# Patient Record
Sex: MALE | Race: WHITE | Employment: FULL TIME | URBAN - METROPOLITAN AREA
[De-identification: names, ages, dates, MRNs, and addresses within clinical notes are randomized per-mention and may not be internally consistent; named-entity substitution may affect disease eponyms.]

---

## 2020-07-16 ENCOUNTER — TELEPHONE (OUTPATIENT)
Dept: SURGERY | Age: 60
End: 2020-07-16

## 2020-07-16 RX ORDER — QUETIAPINE FUMARATE 50 MG/1
50 TABLET, EXTENDED RELEASE ORAL NIGHTLY
COMMUNITY

## 2020-07-16 RX ORDER — SIMVASTATIN 20 MG
20 TABLET ORAL NIGHTLY
COMMUNITY

## 2020-07-16 RX ORDER — DIVALPROEX SODIUM 500 MG/1
500 TABLET, EXTENDED RELEASE ORAL 2 TIMES DAILY
COMMUNITY

## 2020-07-16 NOTE — TELEPHONE ENCOUNTER
MA received call from [address] with the [place] stating they have a referral for the patient they would like to schedule. [facility] [address] stated she could schedule the appointment for the patient and let him know the information. Patient scheduled for appointment 8/13/2020 @ 3:00pm with Dr Shahla Montgomery in [facility]. [address] informed of location and what to have patient bring to the appointment. Appointment letter mailed to patients home address.      Electronically signed by Lucina Meek on 7/16/20 at 10:44 AM EDT

## 2020-08-12 ENCOUNTER — TELEPHONE (OUTPATIENT)
Dept: CASE MANAGEMENT | Age: 60
End: 2020-08-12

## 2020-08-12 NOTE — TELEPHONE ENCOUNTER
I called the patient and he confirmed his CT lung screening at [facility] on 8/13/2020 at 12:00 pm.  I reminded the patient to arrive at 11:30 am, enter through the main entrance, and register. Patient confirmed.         Electronically signed by Florencio Marquis on 8/12/20 at 2:50 PM EDT

## 2020-08-13 ENCOUNTER — HOSPITAL ENCOUNTER (OUTPATIENT)
Dept: CT IMAGING | Age: 60
Discharge: HOME | End: 2020-08-15
Payer: OTHER GOVERNMENT

## 2020-08-13 ENCOUNTER — TELEPHONE (OUTPATIENT)
Dept: CASE MANAGEMENT | Age: 60
End: 2020-08-13

## 2020-08-13 ENCOUNTER — PREP FOR PROCEDURE (OUTPATIENT)
Dept: SURGERY | Age: 60
End: 2020-08-13

## 2020-08-13 ENCOUNTER — OFFICE VISIT (OUTPATIENT)
Dept: SURGERY | Age: 60
End: 2020-08-13
Payer: OTHER GOVERNMENT

## 2020-08-13 VITALS — HEIGHT: 70 IN | BODY MASS INDEX: 30.06 KG/M2 | WEIGHT: 210 LBS

## 2020-08-13 VITALS
DIASTOLIC BLOOD PRESSURE: 72 MMHG | WEIGHT: 222 LBS | HEIGHT: 70 IN | BODY MASS INDEX: 31.78 KG/M2 | HEART RATE: 77 BPM | SYSTOLIC BLOOD PRESSURE: 125 MMHG | OXYGEN SATURATION: 96 %

## 2020-08-13 PROCEDURE — 99204 OFFICE O/P NEW MOD 45 MIN: CPT | Performed by: SURGERY

## 2020-08-13 PROCEDURE — 99202 OFFICE O/P NEW SF 15 MIN: CPT | Performed by: SURGERY

## 2020-08-13 PROCEDURE — G0297 LDCT FOR LUNG CA SCREEN: HCPCS

## 2020-08-13 RX ORDER — SODIUM PICOSULFATE, MAGNESIUM OXIDE, AND ANHYDROUS CITRIC ACID 10; 3.5; 12 MG/160ML; G/160ML; G/160ML
LIQUID ORAL
Qty: 1 KIT | Refills: 0 | Status: ON HOLD
Start: 2020-08-13 | End: 2020-10-05 | Stop reason: HOSPADM

## 2020-08-13 RX ORDER — SODIUM CHLORIDE 0.9 % (FLUSH) 0.9 %
10 SYRINGE (ML) INJECTION EVERY 12 HOURS SCHEDULED
Status: CANCELLED | OUTPATIENT
Start: 2020-08-13

## 2020-08-13 RX ORDER — SODIUM CHLORIDE 0.9 % (FLUSH) 0.9 %
10 SYRINGE (ML) INJECTION PRN
Status: CANCELLED | OUTPATIENT
Start: 2020-08-13

## 2020-08-13 RX ORDER — SODIUM CHLORIDE, SODIUM LACTATE, POTASSIUM CHLORIDE, CALCIUM CHLORIDE 600; 310; 30; 20 MG/100ML; MG/100ML; MG/100ML; MG/100ML
INJECTION, SOLUTION INTRAVENOUS CONTINUOUS
Status: CANCELLED | OUTPATIENT
Start: 2020-08-13

## 2020-08-13 ASSESSMENT — ENCOUNTER SYMPTOMS
BACK PAIN: 0
RECTAL PAIN: 0
BLOOD IN STOOL: 0
SORE THROAT: 0
EYE DISCHARGE: 0
ABDOMINAL PAIN: 0
NAUSEA: 0
CONSTIPATION: 0
SINUS PAIN: 0
SHORTNESS OF BREATH: 0
DIARRHEA: 0
COUGH: 0
EYE REDNESS: 0
ANAL BLEEDING: 1
EYE PAIN: 0
ABDOMINAL DISTENTION: 0
WHEEZING: 0
VOMITING: 0

## 2020-08-13 NOTE — PATIENT INSTRUCTIONS
Dr. Luna Saras[place] recommended colonoscopy with possible biopsy or polypectomy and he explained the risk, benefits, expected outcome, and alternatives to the procedure. Risks included but are not limited to bleeding, infection, respiratory distress, hypotension, and perforation of the colon. You understood and were in agreement. You will need to have someone bring you to the hospital and take you home because you will not be able to drive or work the rest of that day. Also, you need to have someone stay with you the rest of the day to make sure you do not develop any complications. [facility] General Surgery  CLENPIQ SPLIT COLON PREP  COLON PREP FOR COLONOSCOPY OR COLON SURGERY    It is very important that you follow all of the instructions listed on this sheet carefully (they may be slightly different than the directions on the product that you purchase at the pharmacy) to ensure that your colon is adequately cleaned out or your risk of complications could be increased. 2 Days or More Before Endoscopy:  [address] prep from the pharmacy.  Do not eat corn, tomatoes, peas or watermelon for 5 days before procedure.  If you are on INSULIN or OTHER DIABETIC MEDICATIONS, then check with your primary care physician as to how to adjust your medication while on clear liquid diet and when nothing by mouth. 1 Day Before the Endoscopy:   No solid food - only clear liquids (soup, jello, or juice that you can see through with no solid food) for breakfast, lunch and supper. DO NOT drink or eat anything that is red as it will turn the inside of the colon red and look like blood.  Have at least 8 oz or more of clear liquids for breakfast (7 am to 8 am) and lunch (11:30 am to 12:30 pm).    5 pm, take first 5.4 ounce bottle of CLENPIQ   Over the next 4 to 5 hours drink at least five 8 ounce cups of any of the above clear liquids   You can continue to take liquids until 12 midnight then nothing to eat or drink except as instructed below    Day of Endoscopy:   5 hours prior to scheduled time for colonoscopy, take the second 5.4 ounce bottle of CLENPIQ   Over the next 1 to 2 hours, drink three 8 ounce cups of any of the above clear liquids   Do Not drink anything further within 3 hours of the scheduled time for your colonoscopy!  If any blood pressure medications or heart medications are due in the morning, you should take them with a sip of water. Patient Information and Instructions for Colonoscopy         Definition of Colonoscopy   A colonoscopy is the visual exam of the rectum and colon (large intestine). The exam is done with a tool called a colonoscope. The colonoscope is a flexible tube with a tiny camera on the end. This instrument allows the doctor to view the inside of your rectum and colon. Sigmoidoscopy is a shorter scope that views only the last one third of the colon. Reasons for Colonoscopy   It is used to examine, diagnose, and treat problems in your large intestine. The procedure is most often done for the following reasons: To determine the cause of abdominal pain, rectal bleeding, or a change in bowel habits   To detect and treat colon cancer or colon polyps   To obtain tissue samples for testing   To stop intestinal bleeding   Monitor response to treatment if you have inflammatory bowel disease     Possible Complications   Complications are rare, but no procedure is completely free of risk.  If you are planning to have a colonoscopy, your doctor will review a list of possible complications, which may include:   Bleeding   Reaction to the sedation causing drop in your blood pressure or problems breathing  Perforation or puncture of the bowel     Factors that may increase the risk of complications include:   Pre-existing heart or kidney condition   Treatment with certain medicines, including aspirin and other drugs with anticoagulant or blood-thinning properties   Prior abdominal surgery or radiation treatments   Active colitis , diverticulitis , or other acute bowel disease   Previous treatment with radiation therapy     Be sure to discuss these risks with your doctor before the procedure. What to Expect   Prior to Procedure   Your doctor will likely do the following:   Physical exam   Health history   Review of medicines   Test your stool for hidden blood (called \"occult blood\")     Your colon must be completely clean before the procedure. Any stool left in the intestine will block the view. This preparation may start several days before the procedure. Follow your doctor's instructions. Leading up to your procedure:   Talk to your doctor about your medicines. You may be asked to stop taking some medicines up to one week before the procedure, like:   Anti-inflammatory drugs (e.g., aspirin )   Blood thinners like clopidogrel (Plavix) or warfarin (Coumadin)   Iron supplements or vitamins containing iron   The day or days before your procedure, go on a clear liquid diet (clear broth, clear juice, clear jello) with no red coloring  Do not eat or drink anything after midnight. Wear comfortable clothing. If you have diabetes, ask your doctor if you need to adjust your diabetes medicine on the day prior to your procedure and the day of your procedure. Arrange for a ride home after the procedure. Anesthesia   You will receive intravenous sedation medicine for the procedure so you will not feel anything during the procedure. Description of the Procedure   You will lie on your left side with knees bent and drawn up toward your chest. The colonoscope will be slowly inserted through the rectum and into the bowel. The colonoscope will inject air into the colon. A small attached video camera will allow the doctor to view the colon's lining on a screen. The doctor will continue guiding the tool through the bowel and assess the lining.  A tissue sample or polyps may be removed during the procedure. How Long Will It Take? Usually it takes about 30 to 45 minutes     Will It Hurt? Most people do not feel anything during the procedure and will not remember the procedure. After the procedure, gas pains and cramping are common. These pains should go away with the passing of gas. Post-procedure Care   If any tissue was removed: It will be sent to a lab to be examined. It may take 1-2 weeks for results. The doctor will usually give an initial report after the scope is removed. Other tests may be recommended. A small amount of bleeding may occur during the first few days after the procedure. When you return home after the procedure, be sure to follow your doctor's instructions, which may include:   Resume medicines as instructed by your doctor. Resume normal diet, unless directed otherwise by your doctor. The sedative will make you drowsy. Avoid driving, operating machinery, or making important decisions for the rest of the day. Rest for the remainder of the day. After arriving home, contact your doctor if any of the following occurs:   Bleeding from your rectum, notify your doctor if you pass a teaspoonful of blood or more. Black, tarry stools   Severe abdominal pain   Hard, swollen abdomen   Signs of infection, including fever or chills   Inability to pass gas or stool   Coughing, shortness of breath, chest pain, severe nausea or vomiting     In case of an emergency, CALL 911 .

## 2020-08-13 NOTE — PROGRESS NOTES
Scheduled pt for Colonoscopy on 10/5/20 at 8:00 am. Pt needs to arrive at [address] at 7:00 am. Confirmed in office. Sent instruction sheet.   Electronically signed by Merlin Loll on 8/13/20 at 1:52 PM EDT

## 2020-08-13 NOTE — LETTER
Worry: Not on file     Inability: Not on file    Transportation needs     Medical: Not on file     Non-medical: Not on file   Tobacco Use    Smoking status: Current Every Day Smoker     Packs/day: 1.00    Smokeless tobacco: Never Used   Substance and Sexual Activity    Alcohol use: Yes     Alcohol/week: 3.0 standard drinks     Types: 3 Cans of beer per week     Comment: Thursdays - about 3 beers     Drug use: No    Sexual activity: Not on file   Lifestyle    Physical activity     Days per week: Not on file     Minutes per session: Not on file    Stress: Not on file   Relationships    Social connections     Talks on phone: Not on file     Gets together: Not on file     Attends Jain service: Not on file     Active member of club or organization: Not on file     Attends meetings of clubs or organizations: Not on file     Relationship status: Not on file    Intimate partner violence     Fear of current or ex partner: Not on file     Emotionally abused: Not on file     Physically abused: Not on file     Forced sexual activity: Not on file   Other Topics Concern    Not on file   Social History Narrative    Not on file     Review of Systems   Constitutional: Negative for chills, fever and unexpected weight change. HENT: Negative for congestion, hearing loss, nosebleeds, sinus pain and sore throat. Eyes: Negative for pain, discharge and redness. Respiratory: Negative for cough, shortness of breath and wheezing. Cardiovascular: Negative for chest pain, palpitations and leg swelling. Gastrointestinal: Positive for anal bleeding. Negative for abdominal distention, abdominal pain, blood in stool, constipation, diarrhea, nausea, rectal pain and vomiting. Endocrine: Negative for cold intolerance and heat intolerance. Genitourinary: Negative for dysuria, frequency, hematuria and urgency. Musculoskeletal: Negative for back pain and neck pain. Skin: Positive for rash (saw dermatologist 8/13/2020 and \"frozen\" skin lesions on left cheek). Allergic/Immunologic: Negative for environmental allergies. Neurological: Negative for dizziness, tremors, seizures, weakness and headaches. Hematological: Does not bruise/bleed easily. Psychiatric/Behavioral: Positive for dysphoric mood (controlled with medications). The patient is nervous/anxious (controlled with medications). Physical Exam:  Vitals:    08/13/20 1307   BP: 125/72   Site: Left Upper Arm   Position: Sitting   Cuff Size: Medium Adult   Pulse: 77   SpO2: 96%   Weight: 222 lb (100.7 kg)   Height: 5' 10\" (1.778 m)       Body mass index is 31.85 kg/m². Physical Exam  Constitutional:       General: He is not in acute distress. Appearance: He is well-developed. He is not diaphoretic. HENT:      Head: Normocephalic and atraumatic. Eyes:      General:         Right eye: No discharge. Left eye: No discharge. Neck:      Musculoskeletal: Normal range of motion. Cardiovascular:      Rate and Rhythm: Normal rate and regular rhythm. Heart sounds: Heart sounds are distant. No murmur. No friction rub. No gallop. Pulmonary:      Effort: Pulmonary effort is normal. No respiratory distress. Breath sounds: Normal breath sounds. No wheezing or rales. Chest:      Chest wall: No tenderness. Abdominal:      General: Bowel sounds are normal. There is no distension. Palpations: Abdomen is soft. Abdomen is not rigid. There is no mass. Tenderness: There is no abdominal tenderness. There is no guarding or rebound. Hernia: There is no hernia in the ventral area or left inguinal area. Comments: Periumbilical and other abdominal laparoscopic surgical scars   Genitourinary:     Penis: Normal.       Scrotum/Testes:         Right: Mass, tenderness or swelling not present. Left: Mass, tenderness or swelling not present. Prostate: Not enlarged and not tender. Rectum: Guaiac result negative. No mass, tenderness, anal fissure, external hemorrhoid or internal hemorrhoid. Normal anal tone. Comments: Chronic dermatitis in both groins and perianal areas  Musculoskeletal: Normal range of motion. General: No deformity. Skin:     General: Skin is warm and dry. Coloration: Skin is not pale. Findings: No erythema or rash. Neurological:      Mental Status: He is alert and oriented to person, place, and time. Psychiatric:         Behavior: Behavior normal.         Judgment: Judgment normal.     Assessment/Plan:  1. Intermittent Rectal Bleeding and History of Colon Polyps - I recommended diagnostic colonoscopy with possible biopsy or polypectomy and explained the risk, benefits, expected outcome, and alternatives to the procedure. Risks included but are not limited to bleeding, infection, respiratory distress, hypotension, and perforation of the colon. The patient understands and is in agreement. 2. Essential Hypertension  3. Hyperlipidemia  4. Depression    Electronically signed by Michoacano Leal MD on 8/13/20 at 1:23 PM EDT    Scheduled pt for Colonoscopy on 10/5/20 at 8:00 am. Pt needs to arrive at [address] [facility] at 7:00 am. Confirmed in office. Sent instruction sheet.   Electronically signed by Adriana Arechiga on 8/13/20 at 1:52 PM EDT

## 2020-08-13 NOTE — TELEPHONE ENCOUNTER
Updating patient's weight and height.        Electronically signed by Errol Chi on 8/13/20 at 12:13 PM EDT

## 2020-08-13 NOTE — PROGRESS NOTES
History and Physical    Patient's Name/Date of Birth: Maki Ballard /1960, [de-identified]64 y.o.), male      Date: August 13, 2020     Chief Complaint:   Chief Complaint   Patient presents with    Surgical Consult     [facility] - patient has had [facility]oscopy, last colonoscopy 4-5 years patient unsure where or by whom     Colonoscopy     no known personal or family history of colon cancer, patient states he believes he had 2+ polyps removed at the last procedure     Other     patient has no complaints      HPI:   The patient was seen in the office today for follow up colonoscopy. He had a colonoscopy about 5 years ago but there was no report available and the patient does not recall where it was done or who the provider was. Patient reports that several polyps were removed and he was told that they were \"OK\" but again he does not know what kind of polyps or when he was recommended another colonoscopy. Also, the patient has been having intermittent rectal bleeding for 2 years with bright red blood when he wipes. It occurs for one or two consecutive BMs about every other month and it frequently follows exertion. He denied any blood in the stool or the commode commode  He denied any diarrhea or constipation and has normal BMs daily. He denied any abdominal pain or bloating. He denied any heartburn, indigestion, nausea, or vomiting. His family history was negative for colon cancer or polyps.     Past Medical History:   Diagnosis Date    Anxiety     Bipolar disorder ([facility] [address]     Hyperlipidemia     Hypertension     Major depressive disorder     PTSD (post-traumatic stress disorder)      Past Surgical History:   Procedure Laterality Date    APPENDECTOMY  2018    laparoscopic, acute appenditis, [address] [place] [address]  [facility]    approximately 2015, per patient had polyps removed but no report available for review, pt does not remember where it was done at or who did it    TOOTH EXTRACTION  1980 1980s x 2       Current Outpatient Medications   Medication Sig Dispense Refill    divalproex (DEPAKOTE ER) 500 MG extended release tablet Take 500 mg by mouth 2 times daily Take one tablet by mouth every morning and take one tablet at bedtime for mood      MENTHOL-METHYL SALICYLATE EX Apply topically Cream. Apply small amount to affected area three times a day if needed for pain      QUEtiapine (SEROQUEL XR) 50 MG extended release tablet Take 50 mg by mouth nightly Take one tablet by mouth at bedtime for mood.  simvastatin (ZOCOR) 20 MG tablet Take 20 mg by mouth nightly Take one tablet by mouth at bedtime for cholesterol       No current facility-administered medications for this visit. No Known Allergies    Family History   Problem Relation Age of Onset    Kidney Disease Mother        Social History     Socioeconomic History    Marital status: Legally      Spouse name: Not on file    Number of children: Not on file    Years of education: Not on file    Highest education level: Not on file   Occupational History    Not on file   Social Needs    Financial resource strain: Not on file    Food insecurity     Worry: Not on file     Inability: Not on file    Transportation needs     Medical: Not on file     Non-medical: Not on file   Tobacco Use    Smoking status: Current Every Day Smoker     Packs/day: 1.00    Smokeless tobacco: Never Used   Substance and Sexual Activity    Alcohol use:  Yes     Alcohol/week: 3.0 standard drinks     Types: 3 Cans of beer per week     Comment: Thursdays - about 3 beers     Drug use: No    Sexual activity: Not on file   Lifestyle    Physical activity     Days per week: Not on file     Minutes per session: Not on file    Stress: Not on file   Relationships    Social connections     Talks on phone: Not on file     Gets together: Not on file     Attends Buddhism service: Not on file     Active member of club or organization: Not on file     Attends meetings of clubs or organizations: Not on file     Relationship status: Not on file    Intimate partner violence     Fear of current or ex partner: Not on file     Emotionally abused: Not on file     Physically abused: Not on file     Forced sexual activity: Not on file   Other Topics Concern    Not on file   Social History Narrative    Not on file     Review of Systems   Constitutional: Negative for chills, fever and unexpected weight change. HENT: Negative for congestion, hearing loss, nosebleeds, sinus pain and sore throat. Eyes: Negative for pain, discharge and redness. Respiratory: Negative for cough, shortness of breath and wheezing. Cardiovascular: Negative for chest pain, palpitations and leg swelling. Gastrointestinal: Positive for anal bleeding. Negative for abdominal distention, abdominal pain, blood in stool, constipation, diarrhea, nausea, rectal pain and vomiting. Endocrine: Negative for cold intolerance and heat intolerance. Genitourinary: Negative for dysuria, frequency, hematuria and urgency. Musculoskeletal: Negative for back pain and neck pain. Skin: Positive for rash (saw dermatologist 8/13/2020 and \"frozen\" skin lesions on left cheek). Allergic/Immunologic: Negative for environmental allergies. Neurological: Negative for dizziness, tremors, seizures, weakness and headaches. Hematological: Does not bruise/bleed easily. Psychiatric/Behavioral: Positive for dysphoric mood (controlled with medications). The patient is nervous/anxious (controlled with medications). Physical Exam:  Vitals:    08/13/20 1307   BP: 125/72   Site: Left Upper Arm   Position: Sitting   Cuff Size: Medium Adult   Pulse: 77   SpO2: 96%   Weight: 222 lb (100.7 kg)   Height: 5' 10\" (1.778 m)       Body mass index is 31.85 kg/m². Physical Exam  Constitutional:       General: He is not in acute distress. Appearance: He is well-developed.  He is not diaphoretic. HENT:      Head: Normocephalic and atraumatic. Eyes:      General:         Right eye: No discharge. Left eye: No discharge. Neck:      Musculoskeletal: Normal range of motion. Cardiovascular:      Rate and Rhythm: Normal rate and regular rhythm. Heart sounds: Heart sounds are distant. No murmur. No friction rub. No gallop. Pulmonary:      Effort: Pulmonary effort is normal. No respiratory distress. Breath sounds: Normal breath sounds. No wheezing or rales. Chest:      Chest wall: No tenderness. Abdominal:      General: Bowel sounds are normal. There is no distension. Palpations: Abdomen is soft. Abdomen is not rigid. There is no mass. Tenderness: There is no abdominal tenderness. There is no guarding or rebound. Hernia: There is no hernia in the ventral area or left inguinal area. Comments: Periumbilical and other abdominal laparoscopic surgical scars   Genitourinary:     Penis: Normal.       Scrotum/Testes:         Right: Mass, tenderness or swelling not present. Left: Mass, tenderness or swelling not present. Prostate: Not enlarged and not tender. Rectum: Guaiac result negative. No mass, tenderness, anal fissure, external hemorrhoid or internal hemorrhoid. Normal anal tone. Comments: Chronic dermatitis in both groins and perianal areas  Musculoskeletal: Normal range of motion. General: No deformity. Skin:     General: Skin is warm and dry. Coloration: Skin is not pale. Findings: No erythema or rash. Neurological:      Mental Status: He is alert and oriented to person, place, and time. Psychiatric:         Behavior: Behavior normal.         Judgment: Judgment normal.     Assessment/Plan:  1.  Intermittent Rectal Bleeding and History of Colon Polyps - I recommended diagnostic colonoscopy with possible biopsy or polypectomy and explained the risk, benefits, expected outcome, and alternatives to the

## 2020-08-13 NOTE — H&P
organization: Not on file     Attends meetings of clubs or organizations: Not on file     Relationship status: Not on file    Intimate partner violence     Fear of current or ex partner: Not on file     Emotionally abused: Not on file     Physically abused: Not on file     Forced sexual activity: Not on file   Other Topics Concern    Not on file   Social History Narrative    Not on file     Review of Systems   Constitutional: Negative for chills, fever and unexpected weight change. HENT: Negative for congestion, hearing loss, nosebleeds, sinus pain and sore throat. Eyes: Negative for pain, discharge and redness. Respiratory: Negative for cough, shortness of breath and wheezing. Cardiovascular: Negative for chest pain, palpitations and leg swelling. Gastrointestinal: Positive for anal bleeding. Negative for abdominal distention, abdominal pain, blood in stool, constipation, diarrhea, nausea, rectal pain and vomiting. Endocrine: Negative for cold intolerance and heat intolerance. Genitourinary: Negative for dysuria, frequency, hematuria and urgency. Musculoskeletal: Negative for back pain and neck pain. Skin: Positive for rash (saw dermatologist 8/13/2020 and \"frozen\" skin lesions on left cheek). Allergic/Immunologic: Negative for environmental allergies. Neurological: Negative for dizziness, tremors, seizures, weakness and headaches. Hematological: Does not bruise/bleed easily. Psychiatric/Behavioral: Positive for dysphoric mood (controlled with medications). The patient is nervous/anxious (controlled with medications). Physical Exam:  Vitals:    08/13/20 1307   BP: 125/72   Site: Left Upper Arm   Position: Sitting   Cuff Size: Medium Adult   Pulse: 77   SpO2: 96%   Weight: 222 lb (100.7 kg)   Height: 5' 10\" (1.778 m)       Body mass index is 31.85 kg/m². Physical Exam  Constitutional:       General: He is not in acute distress. Appearance: He is well-developed.  He is not procedure. Risks included but are not limited to bleeding, infection, respiratory distress, hypotension, and perforation of the colon. The patient understands and is in agreement. 2. Essential Hypertension  3. Hyperlipidemia  4.  Depression    Electronically signed by Eliezer Martinez MD on 8/13/20 at 1:23 PM EDT

## 2020-08-14 ENCOUNTER — TELEPHONE (OUTPATIENT)
Dept: CASE MANAGEMENT | Age: 60
End: 2020-08-14

## 2020-09-25 NOTE — PROGRESS NOTES
Patient notified to get covid test on 9/30 and to self isolate until day of surgery, going to Bd site.

## 2020-09-29 NOTE — PROGRESS NOTES
[address] PRE-ADMISSION TESTING ENDOSCOPY INSTRUCTIONS- [facility]-phone number:[phone]    ENDOSCOPY INSTRUCTIONS:   [x] Bowel prep instructions reviewed. [x] Nothing by mouth after midnight, including gum, candy, mints, or water. Please follow your surgeons instructions if you are required to complete a bowel prep. Colonoscopy- no solid food-only clear liquids the day prior). [x] You may brush your teeth, gargle, but do NOT swallow water. [x] Do not wear makeup, lotions, powders, deodorant. Nail polish as directed by the nurse. [x] Arrange transportation with a responsible adult  to and from the hospital. If you do not have a responsible adult  to transport you, you will need to make arrangements with a medical transportation company (i.e. [employer]. A Uber/taxi/bus is not appropriate unless you are accompanied by a responsible adult ). Arrange for someone to be with you for the remainder of the day and for 24 hours after your procedure due to having had anesthesia. Who will be your  for transportation?___quang_______________   Who will be staying with you for 24 hrs after your procedure?___quang_______________    PARKING INSTRUCTIONS:   [x] Arrival Time:_ [address] on [facility], enter the main entrance. Check in with staff, wear a mask. One person may accompany you. [x] To reach the [facility] from [address] [facility], upon entering the hospital, take elevator B to the 3rd floor. Check in with . A token will be provided for parking. EDUCATION INSTRUCTIONS:  [x] Bring a complete list of your medications, please write the last time you took the medicine, give this list to the nurse. [x] Take the following medications the morning of surgery with 1-2 ounces of water:   [] Stop herbal supplements and vitamins 5 days before your surgery. [] DO NOT take any diabetic medicine the morning of surgery.   Follow instructions for insulin the day before surgery. [] If you are diabetic and your blood sugar is low or you feel symptomatic, you may drink 1-2 ounces of apple juice or take a glucose tablet. The morning of your procedure, you may call the pre-op area if you have concerns about your blood sugar [phone]. [] Use your inhalers the morning of surgery. Bring your emergency inhaler with you day of surgery. [] Follow physician instructions regarding any blood thinners you may be taking. WHAT TO EXPECT:  [] The day of your procedure you will be greeted and checked in by the [employer].  In addition, you will be registered in the [place] by a Patient Access Representative. Please bring your photo ID and insurance card. A nurse will greet you in accordance to the time you are needed in the pre-op area to prepare you for surgery. Please do not be discouraged if you are not greeted in the order you arrive as there are many variables that are involved in patient preparation. Your patience is greatly appreciated as you wait for your nurse. Please bring in items such as: books, magazines, newspapers, electronics, or any other items  to occupy your time in the waiting area. []  Delays may occur. Staff will make a sincere effort to keep you informed of delays. If any delays occur with your procedure, we apologize ahead of time for your inconvenience as we recognize the value of your time.

## 2020-09-30 ENCOUNTER — HOSPITAL ENCOUNTER (OUTPATIENT)
Age: 60
Discharge: HOME | End: 2020-10-02
Payer: OTHER GOVERNMENT

## 2020-09-30 PROCEDURE — U0003 INFECTIOUS AGENT DETECTION BY NUCLEIC ACID (DNA OR RNA); SEVERE ACUTE RESPIRATORY SYNDROME CORONAVIRUS 2 (SARS-COV-2) (CORONAVIRUS DISEASE [COVID-19]), AMPLIFIED PROBE TECHNIQUE, MAKING USE OF HIGH THROUGHPUT TECHNOLOGIES AS DESCRIBED BY CMS-2020-01-R: HCPCS

## 2020-10-02 LAB
SARS-COV-2: NOT DETECTED
SOURCE: NORMAL

## 2020-10-05 ENCOUNTER — ANESTHESIA EVENT (OUTPATIENT)
Dept: ENDOSCOPY | Age: 60
End: 2020-10-05
Payer: OTHER GOVERNMENT

## 2020-10-05 ENCOUNTER — TELEPHONE (OUTPATIENT)
Dept: SURGERY | Age: 60
End: 2020-10-05

## 2020-10-05 ENCOUNTER — HOSPITAL ENCOUNTER (OUTPATIENT)
Age: 60
Setting detail: OUTPATIENT SURGERY
Discharge: HOME | End: 2020-10-05
Attending: SURGERY | Admitting: SURGERY
Payer: OTHER GOVERNMENT

## 2020-10-05 ENCOUNTER — ANESTHESIA (OUTPATIENT)
Dept: ENDOSCOPY | Age: 60
End: 2020-10-05
Payer: OTHER GOVERNMENT

## 2020-10-05 VITALS
HEART RATE: 77 BPM | TEMPERATURE: 97.8 F | SYSTOLIC BLOOD PRESSURE: 146 MMHG | HEIGHT: 69 IN | DIASTOLIC BLOOD PRESSURE: 89 MMHG | BODY MASS INDEX: 32.58 KG/M2 | RESPIRATION RATE: 17 BRPM | WEIGHT: 220 LBS | OXYGEN SATURATION: 94 %

## 2020-10-05 VITALS
RESPIRATION RATE: 24 BRPM | SYSTOLIC BLOOD PRESSURE: 195 MMHG | DIASTOLIC BLOOD PRESSURE: 133 MMHG | OXYGEN SATURATION: 98 %

## 2020-10-05 PROCEDURE — 3609010600 HC COLONOSCOPY POLYPECTOMY SNARE/COLD BIOPSY: Performed by: SURGERY

## 2020-10-05 PROCEDURE — 3700000000 HC ANESTHESIA ATTENDED CARE: Performed by: SURGERY

## 2020-10-05 PROCEDURE — 45385 COLONOSCOPY W/LESION REMOVAL: CPT | Performed by: SURGERY

## 2020-10-05 PROCEDURE — 3700000001 HC ADD 15 MINUTES (ANESTHESIA): Performed by: SURGERY

## 2020-10-05 PROCEDURE — 7100000010 HC PHASE II RECOVERY - FIRST 15 MIN: Performed by: SURGERY

## 2020-10-05 PROCEDURE — 2580000003 HC RX 258: Performed by: SURGERY

## 2020-10-05 PROCEDURE — 88305 TISSUE EXAM BY PATHOLOGIST: CPT

## 2020-10-05 PROCEDURE — 6360000002 HC RX W HCPCS: Performed by: NURSE ANESTHETIST, CERTIFIED REGISTERED

## 2020-10-05 PROCEDURE — 7100000011 HC PHASE II RECOVERY - ADDTL 15 MIN: Performed by: SURGERY

## 2020-10-05 PROCEDURE — 2709999900 HC NON-CHARGEABLE SUPPLY: Performed by: SURGERY

## 2020-10-05 PROCEDURE — 3609010200 HC COLONOSCOPY ABLATION TUMOR POLYP/OTHER LES: Performed by: SURGERY

## 2020-10-05 PROCEDURE — 45380 COLONOSCOPY AND BIOPSY: CPT | Performed by: SURGERY

## 2020-10-05 RX ORDER — FENTANYL CITRATE 50 UG/ML
INJECTION, SOLUTION INTRAMUSCULAR; INTRAVENOUS PRN
Status: DISCONTINUED | OUTPATIENT
Start: 2020-10-05 | End: 2020-10-05 | Stop reason: SDUPTHER

## 2020-10-05 RX ORDER — SODIUM CHLORIDE 0.9 % (FLUSH) 0.9 %
10 SYRINGE (ML) INJECTION PRN
Status: DISCONTINUED | OUTPATIENT
Start: 2020-10-05 | End: 2020-10-05 | Stop reason: HOSPADM

## 2020-10-05 RX ORDER — PROPOFOL 10 MG/ML
INJECTION, EMULSION INTRAVENOUS PRN
Status: DISCONTINUED | OUTPATIENT
Start: 2020-10-05 | End: 2020-10-05 | Stop reason: SDUPTHER

## 2020-10-05 RX ORDER — SODIUM CHLORIDE, SODIUM LACTATE, POTASSIUM CHLORIDE, CALCIUM CHLORIDE 600; 310; 30; 20 MG/100ML; MG/100ML; MG/100ML; MG/100ML
INJECTION, SOLUTION INTRAVENOUS CONTINUOUS
Status: DISCONTINUED | OUTPATIENT
Start: 2020-10-05 | End: 2020-10-05 | Stop reason: HOSPADM

## 2020-10-05 RX ORDER — SODIUM CHLORIDE 0.9 % (FLUSH) 0.9 %
10 SYRINGE (ML) INJECTION EVERY 12 HOURS SCHEDULED
Status: DISCONTINUED | OUTPATIENT
Start: 2020-10-05 | End: 2020-10-05 | Stop reason: HOSPADM

## 2020-10-05 RX ADMIN — PROPOFOL 730 MG: 10 INJECTION, EMULSION INTRAVENOUS at 08:17

## 2020-10-05 RX ADMIN — SODIUM CHLORIDE, POTASSIUM CHLORIDE, SODIUM LACTATE AND CALCIUM CHLORIDE: 600; 310; 30; 20 INJECTION, SOLUTION INTRAVENOUS at 07:19

## 2020-10-05 RX ADMIN — FENTANYL CITRATE 100 MCG: 50 INJECTION, SOLUTION INTRAMUSCULAR; INTRAVENOUS at 08:18

## 2020-10-05 ASSESSMENT — PAIN SCALES - GENERAL
PAINLEVEL_OUTOF10: 0

## 2020-10-05 ASSESSMENT — PAIN - FUNCTIONAL ASSESSMENT: PAIN_FUNCTIONAL_ASSESSMENT: 0-10

## 2020-10-05 ASSESSMENT — LIFESTYLE VARIABLES: SMOKING_STATUS: 1

## 2020-10-05 NOTE — TELEPHONE ENCOUNTER
RAFAEL SÁNCHEZ with office contact information for pt to return call to let him know that he is scheduled to see Dr. Lucina Goyal on 10/15/20 @ 12:30 pm to discuss colonoscopy and possible surgery. Dr. Esteban Rees will be in clinic that day as well.   Electronically signed by Louis Peraza on 10/5/20 at 4:27 PM EDT

## 2020-10-05 NOTE — ANESTHESIA PRE PROCEDURE
Department of Anesthesiology  Preprocedure Note       Name:  Lory Huntley   Age:  64 y.o.  :  1960                                          MRN:  [id number]         Date:  10/5/2020      Surgeon: Gabriela Ngo):  Bakari Perez MD    Procedure: Procedure(s):  COLONOSCOPY DIAGNOSTIC    Medications prior to admission:   Prior to Admission medications    Medication Sig Start Date End Date Taking? Authorizing Provider   divalproex (DEPAKOTE ER) 500 MG extended release tablet Take 500 mg by mouth 2 times daily Take one tablet by mouth every morning and take one tablet at bedtime for mood   Yes Historical Provider, MD   MENTHOL-METHYL SALICYLATE EX Apply topically Cream. Apply small amount to affected area three times a day if needed for pain   Yes Historical Provider, MD   QUEtiapine (SEROQUEL XR) 50 MG extended release tablet Take 50 mg by mouth nightly Take one tablet by mouth at bedtime for mood. Yes Historical Provider, MD   simvastatin (ZOCOR) 20 MG tablet Take 20 mg by mouth nightly Take one tablet by mouth at bedtime for cholesterol   Yes Historical Provider, MD   Sod Picosulfate-Mag Ox-Cit Acd (CLENPIQ) 10-3.5-12 MG-GM -GM/160ML SOLN Take as directed 20   Bakari Perez MD       Current medications:    No current facility-administered medications for this encounter. Current Outpatient Medications   Medication Sig Dispense Refill    divalproex (DEPAKOTE ER) 500 MG extended release tablet Take 500 mg by mouth 2 times daily Take one tablet by mouth every morning and take one tablet at bedtime for mood      MENTHOL-METHYL SALICYLATE EX Apply topically Cream. Apply small amount to affected area three times a day if needed for pain      QUEtiapine (SEROQUEL XR) 50 MG extended release tablet Take 50 mg by mouth nightly Take one tablet by mouth at bedtime for mood.       simvastatin (ZOCOR) 20 MG tablet Take 20 mg by mouth nightly Take one tablet by mouth at bedtime for cholesterol      Sod Picosulfate-Mag Ox-Cit Acd (CLENPIQ) 10-3.5-12 MG-GM -GM/160ML SOLN Take as directed 1 kit 0       Allergies:  No Known Allergies    Problem List:    Patient Active Problem List   Diagnosis Code    MDD (major depressive disorder) F32.9    Hypertension I10    Hyperlipidemia E78.5       Past Medical History:        Diagnosis Date    Anxiety     Bipolar disorder ([address]     Hyperlipidemia     Hypertension     Major depressive disorder     PTSD (post-traumatic stress disorder)        Past Surgical History:        Procedure Laterality Date    APPENDECTOMY  2018    laparoscopic, acute appenditis, [address] [place] [address] 144  2015    approximately 2015, per patient had polyps removed but no report available for review, pt does not remember where it was done at or who did it   [address] x 2       Social History:    Social History     Tobacco Use    Smoking status: Current Every Day Smoker     Packs/day: 1.00     Years: 40.00     Pack years: 40.00    Smokeless tobacco: Never Used   Substance Use Topics    Alcohol use: Yes     Alcohol/week: 3.0 standard drinks     Types: 3 Cans of beer per week     Comment: Thursdays - about 3 beers                                 Ready to quit: Yes  Counseling given: Not Answered      Vital Signs (Current):   Vitals:    09/29/20 1437   Weight: 220 lb (99.8 kg)   Height: 5' 9\" (1.753 m)                                              BP Readings from Last 3 Encounters:   08/13/20 125/72       NPO Status:                                                                                 BMI:   Wt Readings from Last 3 Encounters:   08/13/20 222 lb (100.7 kg)   08/13/20 210 lb (95.3 kg)     Body mass index is 32.49 kg/m².     CBC:   Lab Results   Component Value Date    WBC 6.6 01/11/2014    RBC 5.07 01/11/2014    HGB 16.3 01/11/2014    HCT 48.5 01/11/2014    MCV 95.6 01/11/2014    RDW 14.1 01/11/2014     01/11/2014       CMP:   Lab

## 2020-10-05 NOTE — H&P
History and Physical    Patient's Name/Date of Birth: Dena Samano / 1960, (61 y.o.), male      Date: October 5, 2020     Chief Complaint: Rectal bleeding and history of colon polyps    HPI:   Patient was seen in the office on 8/13/2020 for the above complaints. Patient had a colonoscopy about 5 years ago that per patient several polyps removed however there is no report available to review. She is unsure what kind of polyps these were. Also, patient has been having intermittent rectal bleeding over the last 2 years. Blood is bright red when he wipes and occurs for 1-2 consecutive bowel movements and occurs about every other month. He frequently follows exertion. He was recommended colonoscopy and is here today for this. He denies any rectal bleeding since I saw him in the office. He denies any change in his personal family medical history since I last saw him. Family history was negative for colon cancer or polyps.     Past Medical History:   Diagnosis Date    Anxiety     Bipolar disorder ([address]     Hyperlipidemia     Hypertension     Major depressive disorder     PTSD (post-traumatic stress disorder)          Past Surgical History:   Procedure Laterality Date    APPENDECTOMY  2018    laparoscopic, acute appenditis, [address] [place] [address]    COLONOSCOPY  2015    approximately 2015, per patient had polyps removed but no report available for review, pt does not remember where it was done at or who did it   [facility] [address]    1980s x 2       Current Facility-Administered Medications   Medication Dose Route Frequency Provider Last Rate Last Dose    lactated ringers infusion   Intravenous Continuous Jaden Fleming MD 75 mL/hr at 10/05/20 0719      sodium chloride flush 0.9 % injection 10 mL  10 mL Intravenous 2 times per day Jaden Fleming MD        sodium chloride flush 0.9 % injection 10 mL  10 mL Intravenous PRN Jaden Fleming MD           No Known Allergies    Family History   Problem Relation Age of Onset    Kidney Disease Mother     Heart Attack Father     Heart Disease Father        Social History     Socioeconomic History    Marital status: Legally      Spouse name: Not on file    Number of children: Not on file    Years of education: Not on file    Highest education level: Not on file   Occupational History    Not on file   Social Needs    Financial resource strain: Not on file    Food insecurity     Worry: Not on file     Inability: Not on file   [employer] needs     Medical: Not on file     Non-medical: Not on file   Tobacco Use    Smoking status: Current Every Day Smoker     Packs/day: 1.00     Years: 40.00     Pack years: 40.00    Smokeless tobacco: Never Used   Substance and Sexual Activity    Alcohol use:  Yes     Alcohol/week: 3.0 standard drinks     Types: 3 Cans of beer per week     Comment: Thursdays - about 3 beers     Drug use: No    Sexual activity: Not on file   Lifestyle    Physical activity     Days per week: Not on file     Minutes per session: Not on file    Stress: Not on file   Relationships    Social connections     Talks on phone: Not on file     Gets together: Not on file     Attends [employer] service: Not on file     Active member of club or organization: Not on file     Attends meetings of clubs or organizations: Not on file     Relationship status: Not on file    Intimate partner violence     Fear of current or ex partner: Not on file     Emotionally abused: Not on file     Physically abused: Not on file     Forced sexual activity: Not on file   Other Topics Concern    Not on file   Social History Narrative    Not on file       ROS:  Noncontributory    Physical Exam:  Vitals:    09/29/20 1437 10/05/20 0657   BP:  134/78   Pulse:  82   Resp:  20   Temp:  97.4 °F (36.3 °C)   TempSrc:  Temporal   SpO2:  96%   Weight: 220 lb (99.8 kg) 220 lb (99.8 kg)   Height: 5' 9\" (1.753 m) 5' 9\" (1.753 m) Body mass index is 32.49 kg/m². Chest: Breath sounds were clear and equal with no rales, wheezes, or rhonchi. Respiratory effort was normal with no retractions or use of accessory muscles. Cardiovascular: Heart sounds were normal with a regular rate and rhythm. There were no murmurs or gallops. Abdomen: Bowel sounds were normal.  The abdomen was soft and non distended. There was no tenderness, guarding, rebound, or rigidity. There was no masses, hepatosplenomegaly, or hernias. Assessment/Plan:  1. Intermittent Rectal Bleeding and History of Colon Polyps - I recommended diagnostic colonoscopy with possible biopsy or polypectomy and explained the risk, benefits, expected outcome, and alternatives to the procedure. Risks included but are not limited to bleeding, infection, respiratory distress, hypotension, and perforation of the colon. The patient understands and is in agreement. 2. Essential Hypertension  3. Hyperlipidemia  4.  Depression    Electronically signed by Sumit Cevallos MD on 10/5/20 at 8:04 AM EDT

## 2020-10-05 NOTE — PROGRESS NOTES
Covid testing done  Results negative  Self quarantine guidelines have been maintained  No unusual signs or symptoms to report  Screening questionnaire completed

## 2020-10-05 NOTE — OP NOTE
PROCEDURE NOTE    DATE OF PROCEDURE: 10/5/2020    SURGEON: Deonna Haines M.D.    ASSISTANT: None    PREOPERATIVE DIAGNOSIS: Diagnostic colonoscopy for Rectal bleeding and history of colon polyps    POSTOPERATIVE DIAGNOSIS: Same with multiple colon polyps as follows:  1. Sessile (12 x 8 mm) polyp cecum adjacent to the appendiceal orifice  2. Pedunculated (6 x 6 mm) polyp cecum adjacent to polyp #1 above  3. Sessile polyp (8 x 5 mm) cecum behind a fold  4. Sessile polyp (5 x 5 mm) proximal ascending colon  5. Sessile polyp (5 x 5 mm) proximal ascending colon  6. Sessile polyp (4 x 4 mm) splenic flexure  7. Sessile polyp (5 x 5 mm) sigmoid colon 40 cm from anus  8. Sessile polyp (4 x 4 mm) sigmoid colon 25 cm from anus  9. Sessile polyp (3 x 3 mm) sigmoid colon 22 cm from the anus  10. Sessile polyp (3 x 3 mm) sigmoid colon 20 cm from anus    OPERATION: Total colonoscopy with biopsy and piecemeal polypectomy and cauterization polyps #1 and #2 above, biopsy and cauterization polyp #4 above, biopsy and cauterization polyp #6 above, biopsy and cauterization polyp #7 above, biopsy and cauterization polyp #8 above, simple cauterization polyp #9 above, and biopsy and cauterization polyp #10 above    ANESTHESIA: Local monitored anesthesia. ESTIMATED BLOOD LOSS: less than 50     COMPLICATIONS: None. SPECIMENS:  Was Obtained: Biopsy multiple colon polyps x 6    HISTORY: The patient is a 64y.o. year old male with history of above preop diagnosis. I recommended colonoscopy with possible biopsy or polypectomy and I explained the risk, benefits, expected outcome, and alternatives to the procedure. Risks included but are not limited to bleeding, infection, respiratory distress, hypotension, and perforation of the colon. The patient understands and is in agreement. PROCEDURE: The patient was given IV conscious sedation per anesthesia. The patient was given supplemental oxygen by nasal cannula.   The colonoscope was inserted per rectum and advanced under direct vision to the cecum without difficulty. The prep was good so exam was adequate. FINDINGS:  Cecum/Ascending colon: abnormal: There was a sessile (12 x 8 mm) polyp cecum adjacent to the appendiceal orifice that was biopsied multiple times and was cauterized. Adjacent to this polyp there is a pedunculated (6 x 6 mm) polyp that was biopsied and removed with snare polypectomy and sent with the same specimen is the above polyp. Also, there was a sessile polyp (8 x 5 mm) cecum behind a fold that could not be reached to biopsy or remove. In the proximal ascending colon just above the cecum, there was a sessile polyp (5 x 5 mm) that was removed with biopsy and cauterized. Also, there was a second sessile polyp (5 x 5 mm) in the proximal ascending colon that was behind a fold and could not be reached to biopsy or remove. Transverse colon: normal    Descending/Sigmoid colon: abnormal: There was a sessile polyp (4 x 4 mm) splenic flexure that was removed with biopsy and cauterized. There was a sessile polyp (5 x 5 mm) sigmoid colon 40 cm from anus that was removed with biopsy and cauterized. There was a sessile polyp (4 x 4 mm) sigmoid colon 25 cm from anus that was removed with biopsy and cauterized. There was a sessile polyp (3 x 3 mm) sigmoid colon 22 cm from the anus that was simply cauterized. There was a sessile polyp (3 x 3 mm) sigmoid colon 20 cm from anus that was removed with biopsy and cauterized. Rectum/Anus: examined in normal and retroflexed positions and was normal    The colon was decompressed and the scope was removed. The withdraw time was approximately 50 minutes. The patient tolerated the procedure well. ASSESSMENT/PLAN:   1. Rectal bleeding - there were no abnormality seen account for this. Most likely secondary to intermittent irritation in the anal area. Observe for further bleeding.   2. History of colon polyps with multiple recurrent right and left-sided colon polyps - will have patient follow-up with me in the office in 2 weeks and will review pathology report and decide on further treatment and/or follow-up colonoscopies.     Electronically signed by Sasha Andrade MD on 10/5/20 at 9:16 AM EDT

## 2020-10-05 NOTE — ANESTHESIA POSTPROCEDURE EVALUATION
Department of Anesthesiology  Postprocedure Note    Patient: Vernell Hickman  MRN: [id number]  YOB: 1960  Date of evaluation: 10/5/2020  Time:  9:23 AM     Procedure Summary     Date:  10/05/20 Room / Location:  [facility]    Anesthesia Start:  3156 Anesthesia Stop:  9102    Procedure:  COLONOSCOPY POLYPECTOMY SNARE/COLD BIOPSY (N/A ) Diagnosis:  (RECTAL BLEEDING)    Surgeon:  Oswald Nguyen MD Responsible Provider:  Vijay Green MD    Anesthesia Type:  MAC ASA Status:  3          Anesthesia Type: MAC    Cristian Phase I: Cristian Score: 10    Cristian Phase II:      Last vitals: Reviewed and per EMR flowsheets.        Anesthesia Post Evaluation    Patient location during evaluation: PACU  Patient participation: complete - patient participated  Level of consciousness: awake and alert  Pain score: 0  Airway patency: patent  Nausea & Vomiting: no vomiting and no nausea  Complications: no  Cardiovascular status: hemodynamically stable  Respiratory status: acceptable  Hydration status: stable

## 2020-10-15 ENCOUNTER — OFFICE VISIT (OUTPATIENT)
Dept: SURGERY | Age: 60
End: 2020-10-15
Payer: OTHER GOVERNMENT

## 2020-10-15 VITALS
DIASTOLIC BLOOD PRESSURE: 86 MMHG | BODY MASS INDEX: 32.72 KG/M2 | HEIGHT: 69 IN | OXYGEN SATURATION: 94 % | HEART RATE: 86 BPM | TEMPERATURE: 97.7 F | SYSTOLIC BLOOD PRESSURE: 134 MMHG | RESPIRATION RATE: 16 BRPM | WEIGHT: 220.9 LBS

## 2020-10-15 PROCEDURE — 99212 OFFICE O/P EST SF 10 MIN: CPT | Performed by: SURGERY

## 2020-10-15 NOTE — LETTER
[address]  [address], [address]  [phone] (Fax)    October 15, 2020     Shruthi Santacruz DO  [address] [address]    Patient: Gt Krueger   YOB: 1960   Date of Visit: 10/15/2020       Dear Shruthi Santacruz: Thank you for referring Mary Amato to me for evaluation. Attached is my office note. If you have questions, please do not hesitate to call me. I look forward to following this patient along with you. Sincerely,    Electronically signed by Aniket Mcintyre MD on 10/15/20 at 2:32 PM EDT                                                                    General Surgery Progress Note  Date: 10/15/2020     Chief Complaint   Patient presents with    Follow Up After Procedure     patient is here to discuss colonoscopy results. History:  The patient is 64 y.o. male who I saw in the office on 8/13/2020 for evaluation for colonoscopy. He had a colonoscopy approximately 2015 but no report was available from that time. Per the patient he had some polyps removed but again there is no pathology report. Recommended repeat colonoscopy which she had performed on 10/5/2020. He was found to have 10 different colon polyps. Five of these were in the cecum. Three of these polyps were biopsied and cauterized as much as possible. The largest of these was adjacent to the appendiceal orifice and was difficult to tell if I was able to completely destroyed the polyp with cautery. There was another polyp that was behind the ileocecal valve and another polyp that was behind a fold just above the ileocecal valve. I could not access either of these polyps to get a good biopsy. The other 5 polyps were at the splenic flexure and sigmoid colon. Four of these were biopsied and cauterized and the other polyp was removed with biopsy. Patient returns office today for follow-up and discussion of the pathology report from the above procedure. He had no problems following his colonoscopy. The biopsies from the cecal polyps all showed serrated polyps but there is no evidence of carcinoma. One of the left-sided polyps was a hyperplastic polyp but the other 3 polyps that were biopsied were tubular adenomas. I discussed the findings with the patient (see figure below). Prior to Admission medications    Medication Sig Start Date End Date Taking? Authorizing Provider   divalproex (DEPAKOTE ER) 500 MG extended release tablet Take 500 mg by mouth 2 times daily Take one tablet by mouth every morning and take one tablet at bedtime for mood   Yes Historical Provider, MD   MENTHOL-METHYL SALICYLATE EX Apply topically Cream. Apply small amount to affected area three times a day if needed for pain   Yes Historical Provider, MD   QUEtiapine (SEROQUEL XR) 50 MG extended release tablet Take 50 mg by mouth nightly Take one tablet by mouth at bedtime for mood. Yes Historical Provider, MD   simvastatin (ZOCOR) 20 MG tablet Take 20 mg by mouth nightly Take one tablet by mouth at bedtime for cholesterol   Yes Historical Provider, MD       No Known Allergies    ROS:  As noted in HPI above    Physical Exam:  /86 (Site: Right Upper Arm, Position: Sitting, Cuff Size: Large Adult)   Pulse 86   Temp 97.7 °F (36.5 °C) (Infrared)   Resp 16   Ht 5' 9\" (1.753 m)   Wt 220 lb 14.4 oz (100.2 kg)   SpO2 94%   BMI 32.62 kg/m²     Impression/Plan:  1. Multiple colon polyps - I informed the patient that I was able to remove all the polyps on left side the colon. However I could not remove 2 of the polyps in the cecum and the other polyp in the cecum not sure that I was able to totally destroyed with cautery.   Therefore I recommend the patient have the right colon removed to ensure the polyps are completely excised. I recommended referral the patient to Dr. Aaron Ballard for laparoscopic right hemicolectomy. Patient was in agreement. Dr. Huey Saunders saw the patient explained the risk, benefits, expected outcome, alternatives to the right hemicolectomy with the patient. Patient is in agreement but will need approval by [place] which she is covered under. 2. Essential Hypertension  3. Hyperlipidemia  4.  Depression    Electronically by Cristina Hoffman MD  on 10/15/2020 at 1:43 PM

## 2020-10-15 NOTE — PROGRESS NOTES
General Surgery Progress Note  Date: 10/15/2020     Chief Complaint   Patient presents with    Follow Up After Procedure     patient is here to discuss colonoscopy results. History:  The patient is 64 y.o. male who I saw in the office on 8/13/2020 for evaluation for colonoscopy. He had a colonoscopy approximately 2015 but no report was available from that time. Per the patient he had some polyps removed but again there is no pathology report. Recommended repeat colonoscopy which she had performed on 10/5/2020. He was found to have 10 different colon polyps. Five of these were in the cecum. Three of these polyps were biopsied and cauterized as much as possible. The largest of these was adjacent to the appendiceal orifice and was difficult to tell if I was able to completely destroyed the polyp with cautery. There was another polyp that was behind the ileocecal valve and another polyp that was behind a fold just above the ileocecal valve. I could not access either of these polyps to get a good biopsy. The other 5 polyps were at the splenic flexure and sigmoid colon. Four of these were biopsied and cauterized and the other polyp was removed with biopsy. Patient returns office today for follow-up and discussion of the pathology report from the above procedure. He had no problems following his colonoscopy. The biopsies from the cecal polyps all showed serrated polyps but there is no evidence of carcinoma. One of the left-sided polyps was a hyperplastic polyp but the other 3 polyps that were biopsied were tubular adenomas. I discussed the findings with the patient (see figure below). Prior to Admission medications    Medication Sig Start Date End Date Taking?  Authorizing Provider   divalproex (DEPAKOTE ER) 500 MG extended release tablet Take 500 mg by mouth 2 times daily Take one tablet by mouth every morning and take one tablet at bedtime for mood   Yes Historical Provider, MD MENTHOL-METHYL SALICYLATE EX Apply topically Cream. Apply small amount to affected area three times a day if needed for pain   Yes Historical Provider, MD   QUEtiapine (SEROQUEL XR) 50 MG extended release tablet Take 50 mg by mouth nightly Take one tablet by mouth at bedtime for mood. Yes Historical Provider, MD   simvastatin (ZOCOR) 20 MG tablet Take 20 mg by mouth nightly Take one tablet by mouth at bedtime for cholesterol   Yes Historical Provider, MD       No Known Allergies    ROS:  As noted in HPI above    Physical Exam:  /86 (Site: Right Upper Arm, Position: Sitting, Cuff Size: Large Adult)   Pulse 86   Temp 97.7 °F (36.5 °C) (Infrared)   Resp 16   Ht 5' 9\" (1.753 m)   Wt 220 lb 14.4 oz (100.2 kg)   SpO2 94%   BMI 32.62 kg/m²     Impression/Plan:  1. Multiple colon polyps - I informed the patient that I was able to remove all the polyps on left side the colon. However I could not remove 2 of the polyps in the cecum and the other polyp in the cecum not sure that I was able to totally destroyed with cautery. Therefore I recommend the patient have the right colon removed to ensure the polyps are completely excised. I recommended referral the patient to Dr. Nash Holloway for laparoscopic right hemicolectomy. Patient was in agreement. Dr. Aydee Johansen saw the patient explained the risk, benefits, expected outcome, alternatives to the right hemicolectomy with the patient. Patient is in agreement but will need approval by [facility] which she is covered under. 2. Essential Hypertension  3. Hyperlipidemia  4.  Depression    Electronically by Prasad Hubbard MD  on 10/15/2020 at 1:43 PM

## 2020-10-19 ENCOUNTER — TELEPHONE (OUTPATIENT)
Dept: SURGERY | Age: 60
End: 2020-10-19

## 2020-10-19 NOTE — TELEPHONE ENCOUNTER
MA contacted surgery scheduling and spoke with Kamila. MA Scheduled pt for Lap Right Hemicolectomy tentatively on 11/02/2020 at 9:30am. Pt needs to arrive at [address] [place] [address] at 7:30am. Awaiting approval from [address] [facility]. MA left voicemail for patient.   Electronically signed by Zach Blank on 10/19/20 at 10:07 AM EDT

## 2020-10-19 NOTE — TELEPHONE ENCOUNTER
Prior Authorization Form:      DEMOGRAPHICS:                     Patient Name:  Taryn Cardoza  Patient :  1960            Insurance:  Payor: [employer] / Plan: [facility] / Product Type: *No Product type* /   Insurance ID Number:    Payor/Plan Subscr  Sex Relation Sub. Ins. ID Effective Group Num   1.  'S AFF* BURKE STONER* 1960 Male Self  22                                    [facility] 4TH FLOOR 136F, [id number] [facility]         DIAGNOSIS & PROCEDURE:                       Procedure/Operation: laparoscopic right hemicolectomy, poss open      CPT Code: 73092    Diagnosis:  History of colon polyps, hyperplastic polyp, tubular adenoma polyp    ICD10 Code: Z86.010, K63.5, D12.5    Location:  [facility]    Surgeon:  Virginia Hull MD    SCHEDULING INFORMATION:                          Date: 2020    Time: 9:30am              Anesthesia:  General                                                       Status:  AM ADMIT        Special Comments:  Progress notes, pathology and OP note sent to [place] 10/19/20       Electronically signed by Miguelito Orellana on 10/19/2020 at 10:09 AM

## 2020-10-28 ENCOUNTER — TELEPHONE (OUTPATIENT)
Dept: SURGERY | Age: 60
End: 2020-10-28

## 2020-10-28 NOTE — TELEPHONE ENCOUNTER
MA received a call from Mary at [facility], who states she has attempted multiple times to reach patient to go over the PAT and COVID testing. [place]  states she spoke patient exwife who states the patient told her he didn't know why they keep calling because the [place] has not approved it yet. MA attempted again for the third time to reach the patient in regards to surgery. Unable to leave a voicemail due to the voicemail box is full. Per the fax from [place] on 10/22/20 (scanned into media), the preop testing, surgery and post op care has been approved for 180 day from 10/20/2020. MA will attempt to contact patient again this afternoon and/or tomorrow morning.  If no response from patient the surgery will be cancelled due to not completing the pre admission testing or COVID test.    Electronically signed by Jerman Watkins on 10/28/20 at 12:14 PM EDT

## 2020-11-03 ENCOUNTER — TELEPHONE (OUTPATIENT)
Dept: SURGERY | Age: 60
End: 2020-11-03

## 2020-11-03 NOTE — TELEPHONE ENCOUNTER
Due to patient not completing COVID testing nor returning phone calls to the office, patient right hemicolectomy with Dr. Randal Gilford on 11/02/2020 was cancelled. MA attempted to contact patient and explain that the [place] has approved pre-op, surgery and post op care. The approval is only good for 180 day from 10/20/2020. MA was unable to reach patient via phone, unable to leave voicemail due to voicemail being full.    Electronically signed by David rBunson on 11/3/20 at 10:48 AM EST

## 2021-01-07 ENCOUNTER — TELEPHONE (OUTPATIENT)
Dept: SURGERY | Age: 61
End: 2021-01-07

## 2021-01-07 NOTE — TELEPHONE ENCOUNTER
MA received a call from [phone] from the [facility]. She spoke with the patient in regards to getting surgery rescheduled. She is going to be sending over a new auth being covered under  and for the surgery he needs. MA explained it was canceled last because patient was never returning calls and never got his COVID test. She stated she was going to call him and let him know the importance of returning calls and getting everything set up so surgery can move forward. MA verbalized understanding and advised her to let patient know that we will be in touch once we get surgery R/S. Gifty Tucker states patient is off on Thursdays so if unable to reach him or get return calls on other days try on a Thursday. MA verbalized she would make note of this. Gifty Tucker number is [phone] if have any further questions or problems. MA routing message to Stacie Varma for further assistance.        Electronically signed by Jason Wong MA on 1/7/21 at 3:04 PM EST

## 2021-01-14 ENCOUNTER — TELEPHONE (OUTPATIENT)
Dept: SURGERY | Age: 61
End: 2021-01-14

## 2021-01-14 NOTE — TELEPHONE ENCOUNTER
MA left message for Ronda Khadar, [phone] at the [place] to confirm, Lap Right Hemicolectomy([id number]) with Dr. Cammie Albarado is approved. MA spoke with patient who agreed to reschedule the surgery. MA contacted surgery scheduling and spoke with Kamila. MA Scheduled pt for Lap right hemicolectomy, poss open on 03/02/2021 at 7:30am. Pt needs to arrive at [address] [place] [address] at 6:30am. Patient confirmed date and time. Address and directions given. Patient questioned about prep prior to surgery. Patient also asked if he would just be off for a couple of days. Patient states he will need to let them know how ling he is going to be off. MA explained I would forward the message to Dr. Cammie Albarado to advise about pre-surgery prep and how long he will need to be off work. Patient understood and can be reached at [phone].     Electronically signed by Daniel Dodson on 1/14/21 at 9:55 AM EST

## 2021-01-15 ENCOUNTER — TELEPHONE (OUTPATIENT)
Dept: SURGERY | Age: 61
End: 2021-01-15

## 2021-01-15 NOTE — TELEPHONE ENCOUNTER
Prior Authorization Form:      DEMOGRAPHICS:                     Patient Name:  Joanne Guzman  Patient :  1960            Insurance:  Payor: [facility] / Plan: [facility] / Product Type: *No Product type* /   Insurance ID Number:    Payor/Plan Subscr  Sex Relation Sub. Ins. ID Effective Group Num   1. 'S AFF* BURKE STONER* 1960 Male Self  22                                    [facility] 4TH FLOOR 136F, [id number] [facility]         DIAGNOSIS & PROCEDURE:                       Procedure/Operation: Lap, right hemicolectomy, poss open, poss gram           CPT Code: 51456    Diagnosis:  Hyperplastic Polyp, tubular adenoma    ICD10 Code: K63.5,D12.5     [facility]  [facility]    Surgeon:  Memo Iglesias MD    [facility] INFORMATION:                          Date: 2021    Time: 7:30am              Anesthesia:  General                                                       Status:  AM Admit        Special Comments:  [facility] Approval, start date 2021 goof for 180 days, see media tab. Gabriel Zaman is contact for [address] [facility] [phone].        Electronically signed by Daniel Dodson on 1/15/2021 at 2:23 PM

## 2021-02-18 ENCOUNTER — OFFICE VISIT (OUTPATIENT)
Dept: SURGERY | Age: 61
End: 2021-02-18
Payer: OTHER GOVERNMENT

## 2021-02-18 VITALS
TEMPERATURE: 96.9 F | DIASTOLIC BLOOD PRESSURE: 93 MMHG | SYSTOLIC BLOOD PRESSURE: 152 MMHG | BODY MASS INDEX: 33.33 KG/M2 | HEART RATE: 83 BPM | WEIGHT: 225 LBS | RESPIRATION RATE: 16 BRPM | OXYGEN SATURATION: 92 % | HEIGHT: 69 IN

## 2021-02-18 DIAGNOSIS — D12.2 ADENOMATOUS POLYP OF ASCENDING COLON: Primary | ICD-10-CM

## 2021-02-18 PROCEDURE — 99204 OFFICE O/P NEW MOD 45 MIN: CPT | Performed by: SURGERY

## 2021-02-18 RX ORDER — SODIUM, POTASSIUM,MAG SULFATES 17.5-3.13G
1 SOLUTION, RECONSTITUTED, ORAL ORAL 2 TIMES DAILY
Qty: 2 BOTTLE | Refills: 0 | Status: SHIPPED | OUTPATIENT
Start: 2021-02-18 | End: 2021-02-19

## 2021-02-18 RX ORDER — NEOMYCIN SULFATE 500 MG/1
1000 TABLET ORAL 3 TIMES DAILY
Qty: 6 TABLET | Refills: 0 | Status: SHIPPED | OUTPATIENT
Start: 2021-02-18 | End: 2021-02-19

## 2021-02-18 RX ORDER — METRONIDAZOLE 500 MG/1
500 TABLET ORAL 3 TIMES DAILY
Qty: 3 TABLET | Refills: 0 | Status: SHIPPED | OUTPATIENT
Start: 2021-02-18 | End: 2021-02-19

## 2021-02-18 NOTE — LETTER
[facility] General Surgery  [address] [address]  Phone: [phone]  Fax: [phone]    Lisa Edwards MD    February 18, 2021     Patient: Sirisha Rodriguez   YOB: 1960   Date of Visit: 2/18/2021       To Whom it May Concern:    Favio Varghese was seen in my clinic on 2/18/2021. He is scheduled for laparoscopic colon surgery on 3/2/2021. He will need to be off work for 4 weeks. If you have any questions or concerns, please don't hesitate to call.     Sincerely,   Electronically signed by Lisa Edwards MD on 2/18/2021 at 10:44 AM

## 2021-02-18 NOTE — PATIENT INSTRUCTIONS
Instructions for   Bowel Preparation with Antibiotics      Why do I need to follow these instructions carefully? Cleansing your bowels with NuLytely and antibiotic pills before surgery decreases your risk of infection. Please read the entire document below about how to perform your bowel preparation correctly. What supplies do I need to prepare in advance? Buy the following items 2 days before surgery or sooner. 1. NuLytely Bowel Prep (We will give you a prescription for this.)   2. Neomycin and metronidazole antibiotics (We will give you prescriptions for these.) You will get:    6 (six) 500-milligram Neomycin tablets    3 (three) 500-milligram metronidazole (Flagyl) tablets   3. Clear liquid items for your diet the day before surgery, such as Gatorade, Jell-O, broth, some juices, soft drinks, [place] ice, etc. (see list below). 4. Carbohydrate drink: Buy at least 36 oz. of No Sugar Added, 100% pure white grape juice. (No substitutions.) You may need to buy a 64 oz. bottle or 2 (two) 32 oz. bottles of this juice. This specific drink has been shown to decrease pre-op (before surgery) discomfort by reducing anxiety, hunger, and thirst. More importantly this will decrease post-op (after surgery) nausea and vomiting, and may shorten your hospital stay. What are my instructions for the day before surgery? The entire day before surgery you will take a clear liquid diet - no solid food. See the list below for what is allowed.      --------------------------------------------------  Clear Liquid Diet Instructions   Allowed liquids:    Clear Liquid Diet Instructions    Water (plain, carbonated, or flavored)    Sports drinks (such as Gatorade)    Clear broth such as bullion or consommé (chicken, beef)    Soda pop including cola, 7-up, Sprite, ginger ale    Jell-O any color    Coffee and teas (without cream or milk added)    Oleksandr-Aid or Crystal Lite    Apple juice, grape juice, cranberry juice (fruit juices without pulp)    Hard candies    Sugar or sugar substitute    Popsicles without milk, bits of fruit, seeds or nuts    [facility] ices     Not allowed:    Milk, cream    Orange juice, tomato juice, grapefruit juice    Alcohol    Any liquid you cannot see through     Avoiding Dehydration:   You must drink at least 6 (six) large glasses of clear liquids the day before surgery, in addition to the prep liquid and a clear liquid breakfast, lunch, and     --------------------------------------------------------------    Instructions for Bowel Prep   Follow this timeline the day before surgery:      Breakfast   Clear liquid diet. You may drink any amount in any combination from the list above.  7 am   Put the suprep bowel prep liquid in the refrigerator to chill. It is easier to drink when it is cold.  9 am   Drink one of the 16 oz bottles of the SUPREP followed immediately by at least 8 oz of clear liquids     1 pm   Take neomycin (2 tablets/1 gram) and metronidazole (1 tablet/500mg).  2 pm   Take neomycin (2 tablets/1 gram) and metronidazole (1 tablet/500mg). · 4:00pm  Drink one of the 16 oz bottles of the SUPREP followed immediately by at least 8 oz of clear liquids     2 pm - 6 pm   Continue drinking 8 oz. clear liquids every hour to avoid dehydration.  6pm   Dinner: Clear liquid diet (any combination/any amount).  7 pm - 10 pm   Drink 24 ounces of the carbohydrate drink (100% pure, no sugar added, white grape juice).  10 pm   Take neomycin (2 tablets/1 gram) and metronidazole (1 tablet/500mg).  12 midnight   Nothing to drink or eat after midnight, except for the carbohydrate drink (white grape juice) and water.      Instructions for Clear liquid diet    Diet details  A clear liquid diet helps maintain adequate hydration, provides some important electrolytes, such as sodium and potassium, and gives some energy at a time when a full diet isn't possible or

## 2021-02-19 NOTE — PROGRESS NOTES
Patient agreed to COVID test on 2/25 at the  [facility]  located at  [address] [place] [address]. between the hours of 6 am- 2:30 pm, instructed to bring ID. Patient instructed to self isolate until day of surgery.

## 2021-02-24 NOTE — PROGRESS NOTES
[address] PRE-ADMISSION TESTING GENERAL INSTRUCTIONS- [facility]-phone number:[phone]    GENERAL INSTRUCTIONS    [x] Edmund wipe instruction sheet and wipes given. [x] Nothing by mouth after midnight, including gum, candy, mints, or water. [x] You may brush your teeth, gargle, but do NOT swallow water. [x]No smoking, chewing tobacco, illegal drugs, or alcohol within 24 hours of your surgery. [x] Jewelry, valuables or body piercing's should not be brought to the hospital. All body and/or tongue piercing's must be removed prior to arriving to hospital.  ALL hair pins must be removed. [x] Do not wear makeup, lotions, powders, deodorant. Nail polish as directed by the nurse. [x] Arrange transportation with a responsible adult  to and from the hospital. If you do not have a responsible adult  to transport you, you will need to make arrangements with a medical transportation company (i.e. [employer]. A Uber/taxi/bus is not appropriate unless you are accompanied by a responsible adult ). Arrange for someone to be with you for the remainder of the day and for 24 hours after your procedure due to having had anesthesia. Who will be your  for transportation?____CHETNA, SIGNIFICANT OTHER______________     [x] Bring insurance card and photo ID. [x] Transfusion Bracelet: Please bring with you to hospital, day of surgery    PARKING INSTRUCTIONS:   [x] Arrival Time: PLEASE ARRIVE 3/2/21 AT 0530 AM TO [facility] ENTRANCE. YOU WILL BE DIRECTED TO PRE OP FOR SURGERY WITH DR Michele Caban. [x] [facility] [address] [facility], upon entering the hospital, take elevator B to the 3rd floor. EDUCATION INSTRUCTIONS:          [x] Avni 77 placed in chart. [x] Pre-admission Testing educational folder given  [x] Incentive Spirometry,coughing & deep breathing exercises reviewed.      [x]Medication information sheet(s) [x]Fluoroscopy-Xray used in surgery reviewed with patient. Educational pamphlet placed in chart. [x]Pain: Post-op pain is normal and to be expected. You will be asked to rate your pain from 0-10(a zero is not acceptable-education is needed). Your post-op pain goal is:  [x] Ask your nurse for your pain medication. MEDICATION INSTRUCTIONS:   [x]Bring a complete list of your medications, please write the last time you took the medicine, give this list to the nurse. [x] Take the following medications the morning of surgery with 1-2 ounces of water: SEE LIST  [x] Stop herbal supplements and vitamins 5 days before your surgery. [x] Follow physician instructions regarding any blood thinners you may be taking. WHAT TO EXPECT:  [x] The day of surgery you will be greeted and checked in by the [employer].  In addition, you will be registered in the [facility] by a Patient Access Representative. Please bring your photo ID and insurance card. A nurse will greet you in accordance to the time you are needed in the pre-op area to prepare you for surgery. Please do not be discouraged if you are not greeted in the order you arrive as there are many variables that are involved in patient preparation. Your patience is greatly appreciated as you wait for your nurse. Please bring in items such as: books, magazines, newspapers, electronics, or any other items  to occupy your time in the waiting area. [x]  Delays may occur with surgery and staff will make a sincere effort to keep you informed of delays. If any delays occur with your procedure, we apologize ahead of time for your inconvenience as we recognize the value of your time.

## 2021-02-25 ENCOUNTER — HOSPITAL ENCOUNTER (OUTPATIENT)
Dept: PREADMISSION TESTING | Age: 61
Discharge: HOME | End: 2021-02-25
Payer: OTHER GOVERNMENT

## 2021-02-25 ENCOUNTER — HOSPITAL ENCOUNTER (OUTPATIENT)
Age: 61
Discharge: HOME | End: 2021-02-27
Payer: OTHER GOVERNMENT

## 2021-02-25 VITALS
TEMPERATURE: 97.6 F | SYSTOLIC BLOOD PRESSURE: 122 MMHG | WEIGHT: 224 LBS | RESPIRATION RATE: 18 BRPM | HEART RATE: 79 BPM | DIASTOLIC BLOOD PRESSURE: 89 MMHG | HEIGHT: 69 IN | OXYGEN SATURATION: 95 % | BODY MASS INDEX: 33.18 KG/M2

## 2021-02-25 DIAGNOSIS — U07.1 COVID-19: ICD-10-CM

## 2021-02-25 DIAGNOSIS — Z01.812 PRE-OPERATIVE LABORATORY EXAMINATION: Primary | ICD-10-CM

## 2021-02-25 DIAGNOSIS — Z01.818 PREOP TESTING: ICD-10-CM

## 2021-02-25 LAB
ABO/RH: NORMAL
ANTIBODY SCREEN: NORMAL
EKG ATRIAL RATE: 78 BPM
EKG P AXIS: 49 DEGREES
EKG P-R INTERVAL: 168 MS
EKG Q-T INTERVAL: 390 MS
EKG QRS DURATION: 132 MS
EKG QTC CALCULATION (BAZETT): 444 MS
EKG R AXIS: 14 DEGREES
EKG T AXIS: 35 DEGREES
EKG VENTRICULAR RATE: 78 BPM
HCT VFR BLD CALC: 46.1 % (ref 37–54)
HEMOGLOBIN: 15.4 G/DL (ref 12.5–16.5)
MCH RBC QN AUTO: 31.9 PG (ref 26–35)
MCHC RBC AUTO-ENTMCNC: 33.4 % (ref 32–34.5)
MCV RBC AUTO: 95.4 FL (ref 80–99.9)
PDW BLD-RTO: 13.5 FL (ref 11.5–15)
PLATELET # BLD: 165 E9/L (ref 130–450)
PMV BLD AUTO: 10 FL (ref 7–12)
RBC # BLD: 4.83 E12/L (ref 3.8–5.8)
WBC # BLD: 5.8 E9/L (ref 4.5–11.5)

## 2021-02-25 PROCEDURE — 86900 BLOOD TYPING SEROLOGIC ABO: CPT

## 2021-02-25 PROCEDURE — 86901 BLOOD TYPING SEROLOGIC RH(D): CPT

## 2021-02-25 PROCEDURE — 93005 ELECTROCARDIOGRAM TRACING: CPT | Performed by: SURGERY

## 2021-02-25 PROCEDURE — 85027 COMPLETE CBC AUTOMATED: CPT

## 2021-02-25 PROCEDURE — 36415 COLL VENOUS BLD VENIPUNCTURE: CPT

## 2021-02-25 PROCEDURE — 86850 RBC ANTIBODY SCREEN: CPT

## 2021-02-25 PROCEDURE — 93010 ELECTROCARDIOGRAM REPORT: CPT | Performed by: INTERNAL MEDICINE

## 2021-02-25 PROCEDURE — U0003 INFECTIOUS AGENT DETECTION BY NUCLEIC ACID (DNA OR RNA); SEVERE ACUTE RESPIRATORY SYNDROME CORONAVIRUS 2 (SARS-COV-2) (CORONAVIRUS DISEASE [COVID-19]), AMPLIFIED PROBE TECHNIQUE, MAKING USE OF HIGH THROUGHPUT TECHNOLOGIES AS DESCRIBED BY CMS-2020-01-R: HCPCS

## 2021-02-25 PROCEDURE — 87081 CULTURE SCREEN ONLY: CPT

## 2021-02-26 LAB — MRSA CULTURE ONLY: NORMAL

## 2021-02-27 LAB
SARS-COV-2: NOT DETECTED
SOURCE: NORMAL

## 2021-03-01 ENCOUNTER — ANESTHESIA EVENT (OUTPATIENT)
Dept: OPERATING ROOM | Age: 61
DRG: 331 | End: 2021-03-01
Payer: OTHER GOVERNMENT

## 2021-03-02 ENCOUNTER — ANESTHESIA (OUTPATIENT)
Dept: OPERATING ROOM | Age: 61
DRG: 331 | End: 2021-03-02
Payer: OTHER GOVERNMENT

## 2021-03-02 ENCOUNTER — HOSPITAL ENCOUNTER (INPATIENT)
Age: 61
LOS: 5 days | Discharge: HOME | DRG: 331 | End: 2021-03-07
Attending: SURGERY | Admitting: SURGERY
Payer: OTHER GOVERNMENT

## 2021-03-02 VITALS — OXYGEN SATURATION: 89 % | DIASTOLIC BLOOD PRESSURE: 74 MMHG | SYSTOLIC BLOOD PRESSURE: 127 MMHG | TEMPERATURE: 96.3 F

## 2021-03-02 DIAGNOSIS — U07.1 COVID-19: Primary | ICD-10-CM

## 2021-03-02 DIAGNOSIS — Z90.49 S/P LAPAROSCOPIC COLECTOMY: ICD-10-CM

## 2021-03-02 PROCEDURE — 3700000000 HC ANESTHESIA ATTENDED CARE: Performed by: SURGERY

## 2021-03-02 PROCEDURE — 97530 THERAPEUTIC ACTIVITIES: CPT

## 2021-03-02 PROCEDURE — 88307 TISSUE EXAM BY PATHOLOGIST: CPT

## 2021-03-02 PROCEDURE — 0DTF4ZZ RESECTION OF RIGHT LARGE INTESTINE, PERCUTANEOUS ENDOSCOPIC APPROACH: ICD-10-PCS | Performed by: SURGERY

## 2021-03-02 PROCEDURE — 2580000003 HC RX 258: Performed by: ANESTHESIOLOGY

## 2021-03-02 PROCEDURE — 3600000004 HC SURGERY LEVEL 4 BASE: Performed by: SURGERY

## 2021-03-02 PROCEDURE — 2500000003 HC RX 250 WO HCPCS

## 2021-03-02 PROCEDURE — 6360000002 HC RX W HCPCS: Performed by: SURGERY

## 2021-03-02 PROCEDURE — 1200000000 HC SEMI PRIVATE

## 2021-03-02 PROCEDURE — 2720000010 HC SURG SUPPLY STERILE: Performed by: SURGERY

## 2021-03-02 PROCEDURE — 2580000003 HC RX 258: Performed by: STUDENT IN AN ORGANIZED HEALTH CARE EDUCATION/TRAINING PROGRAM

## 2021-03-02 PROCEDURE — 44205 LAP COLECTOMY PART W/ILEUM: CPT | Performed by: SURGERY

## 2021-03-02 PROCEDURE — 7100000001 HC PACU RECOVERY - ADDTL 15 MIN: Performed by: SURGERY

## 2021-03-02 PROCEDURE — 2500000003 HC RX 250 WO HCPCS: Performed by: STUDENT IN AN ORGANIZED HEALTH CARE EDUCATION/TRAINING PROGRAM

## 2021-03-02 PROCEDURE — 2700000000 HC OXYGEN THERAPY PER DAY

## 2021-03-02 PROCEDURE — 6360000002 HC RX W HCPCS

## 2021-03-02 PROCEDURE — 6360000002 HC RX W HCPCS: Performed by: ANESTHESIOLOGY

## 2021-03-02 PROCEDURE — 2500000003 HC RX 250 WO HCPCS: Performed by: SURGERY

## 2021-03-02 PROCEDURE — 97161 PT EVAL LOW COMPLEX 20 MIN: CPT

## 2021-03-02 PROCEDURE — 7100000000 HC PACU RECOVERY - FIRST 15 MIN: Performed by: SURGERY

## 2021-03-02 PROCEDURE — 2709999900 HC NON-CHARGEABLE SUPPLY: Performed by: SURGERY

## 2021-03-02 PROCEDURE — 6360000002 HC RX W HCPCS: Performed by: STUDENT IN AN ORGANIZED HEALTH CARE EDUCATION/TRAINING PROGRAM

## 2021-03-02 PROCEDURE — 3600000014 HC SURGERY LEVEL 4 ADDTL 15MIN: Performed by: SURGERY

## 2021-03-02 PROCEDURE — 6370000000 HC RX 637 (ALT 250 FOR IP): Performed by: STUDENT IN AN ORGANIZED HEALTH CARE EDUCATION/TRAINING PROGRAM

## 2021-03-02 PROCEDURE — 2580000003 HC RX 258: Performed by: SURGERY

## 2021-03-02 PROCEDURE — 3700000001 HC ADD 15 MINUTES (ANESTHESIA): Performed by: SURGERY

## 2021-03-02 RX ORDER — MEPERIDINE HYDROCHLORIDE 25 MG/ML
12.5 INJECTION INTRAMUSCULAR; INTRAVENOUS; SUBCUTANEOUS EVERY 5 MIN PRN
Status: DISCONTINUED | OUTPATIENT
Start: 2021-03-02 | End: 2021-03-02

## 2021-03-02 RX ORDER — PROMETHAZINE HYDROCHLORIDE 25 MG/ML
6.25 INJECTION, SOLUTION INTRAMUSCULAR; INTRAVENOUS EVERY 10 MIN PRN
Status: DISCONTINUED | OUTPATIENT
Start: 2021-03-02 | End: 2021-03-02

## 2021-03-02 RX ORDER — FAMOTIDINE 20 MG/1
20 TABLET, FILM COATED ORAL 2 TIMES DAILY
Status: DISCONTINUED | OUTPATIENT
Start: 2021-03-02 | End: 2021-03-07 | Stop reason: HOSPADM

## 2021-03-02 RX ORDER — ROCURONIUM BROMIDE 10 MG/ML
INJECTION, SOLUTION INTRAVENOUS PRN
Status: DISCONTINUED | OUTPATIENT
Start: 2021-03-02 | End: 2021-03-02 | Stop reason: SDUPTHER

## 2021-03-02 RX ORDER — QUETIAPINE FUMARATE 50 MG/1
50 TABLET, EXTENDED RELEASE ORAL NIGHTLY
Status: DISCONTINUED | OUTPATIENT
Start: 2021-03-02 | End: 2021-03-07 | Stop reason: HOSPADM

## 2021-03-02 RX ORDER — MORPHINE SULFATE 10 MG/ML
INJECTION, SOLUTION INTRAMUSCULAR; INTRAVENOUS PRN
Status: DISCONTINUED | OUTPATIENT
Start: 2021-03-02 | End: 2021-03-02 | Stop reason: SDUPTHER

## 2021-03-02 RX ORDER — MORPHINE SULFATE 2 MG/ML
1 INJECTION, SOLUTION INTRAMUSCULAR; INTRAVENOUS EVERY 5 MIN PRN
Status: DISCONTINUED | OUTPATIENT
Start: 2021-03-02 | End: 2021-03-02

## 2021-03-02 RX ORDER — MORPHINE SULFATE 2 MG/ML
1 INJECTION, SOLUTION INTRAMUSCULAR; INTRAVENOUS EVERY 5 MIN PRN
Status: DISCONTINUED | OUTPATIENT
Start: 2021-03-02 | End: 2021-03-02 | Stop reason: HOSPADM

## 2021-03-02 RX ORDER — FENTANYL CITRATE 50 UG/ML
INJECTION, SOLUTION INTRAMUSCULAR; INTRAVENOUS PRN
Status: DISCONTINUED | OUTPATIENT
Start: 2021-03-02 | End: 2021-03-02 | Stop reason: SDUPTHER

## 2021-03-02 RX ORDER — HYDROCODONE BITARTRATE AND ACETAMINOPHEN 5; 325 MG/1; MG/1
2 TABLET ORAL PRN
Status: DISCONTINUED | OUTPATIENT
Start: 2021-03-02 | End: 2021-03-02

## 2021-03-02 RX ORDER — ONDANSETRON 2 MG/ML
INJECTION INTRAMUSCULAR; INTRAVENOUS PRN
Status: DISCONTINUED | OUTPATIENT
Start: 2021-03-02 | End: 2021-03-02 | Stop reason: SDUPTHER

## 2021-03-02 RX ORDER — SODIUM CHLORIDE 9 MG/ML
INJECTION, SOLUTION INTRAVENOUS CONTINUOUS
Status: DISCONTINUED | OUTPATIENT
Start: 2021-03-02 | End: 2021-03-02

## 2021-03-02 RX ORDER — BUPIVACAINE HYDROCHLORIDE 5 MG/ML
INJECTION, SOLUTION EPIDURAL; INTRACAUDAL PRN
Status: DISCONTINUED | OUTPATIENT
Start: 2021-03-02 | End: 2021-03-02 | Stop reason: ALTCHOICE

## 2021-03-02 RX ORDER — MEPERIDINE HYDROCHLORIDE 25 MG/ML
12.5 INJECTION INTRAMUSCULAR; INTRAVENOUS; SUBCUTANEOUS EVERY 5 MIN PRN
Status: DISCONTINUED | OUTPATIENT
Start: 2021-03-02 | End: 2021-03-02 | Stop reason: HOSPADM

## 2021-03-02 RX ORDER — MORPHINE SULFATE 2 MG/ML
2 INJECTION, SOLUTION INTRAMUSCULAR; INTRAVENOUS EVERY 5 MIN PRN
Status: DISCONTINUED | OUTPATIENT
Start: 2021-03-02 | End: 2021-03-03

## 2021-03-02 RX ORDER — SODIUM CHLORIDE 0.9 % (FLUSH) 0.9 %
10 SYRINGE (ML) INJECTION EVERY 12 HOURS SCHEDULED
Status: DISCONTINUED | OUTPATIENT
Start: 2021-03-02 | End: 2021-03-07 | Stop reason: HOSPADM

## 2021-03-02 RX ORDER — OXYCODONE HYDROCHLORIDE 5 MG/1
5 TABLET ORAL EVERY 4 HOURS PRN
Status: DISCONTINUED | OUTPATIENT
Start: 2021-03-02 | End: 2021-03-07 | Stop reason: HOSPADM

## 2021-03-02 RX ORDER — LABETALOL HYDROCHLORIDE 5 MG/ML
INJECTION, SOLUTION INTRAVENOUS PRN
Status: DISCONTINUED | OUTPATIENT
Start: 2021-03-02 | End: 2021-03-02 | Stop reason: SDUPTHER

## 2021-03-02 RX ORDER — DEXTROSE, SODIUM CHLORIDE, AND POTASSIUM CHLORIDE 5; .45; .15 G/100ML; G/100ML; G/100ML
INJECTION INTRAVENOUS CONTINUOUS
Status: DISCONTINUED | OUTPATIENT
Start: 2021-03-02 | End: 2021-03-05

## 2021-03-02 RX ORDER — MIDAZOLAM HYDROCHLORIDE 1 MG/ML
INJECTION INTRAMUSCULAR; INTRAVENOUS PRN
Status: DISCONTINUED | OUTPATIENT
Start: 2021-03-02 | End: 2021-03-02 | Stop reason: SDUPTHER

## 2021-03-02 RX ORDER — MORPHINE SULFATE 2 MG/ML
2 INJECTION, SOLUTION INTRAMUSCULAR; INTRAVENOUS EVERY 5 MIN PRN
Status: DISCONTINUED | OUTPATIENT
Start: 2021-03-02 | End: 2021-03-02 | Stop reason: HOSPADM

## 2021-03-02 RX ORDER — HYDROCODONE BITARTRATE AND ACETAMINOPHEN 5; 325 MG/1; MG/1
1 TABLET ORAL PRN
Status: DISCONTINUED | OUTPATIENT
Start: 2021-03-02 | End: 2021-03-02 | Stop reason: HOSPADM

## 2021-03-02 RX ORDER — PROPOFOL 10 MG/ML
INJECTION, EMULSION INTRAVENOUS PRN
Status: DISCONTINUED | OUTPATIENT
Start: 2021-03-02 | End: 2021-03-02 | Stop reason: SDUPTHER

## 2021-03-02 RX ORDER — DIVALPROEX SODIUM 500 MG/1
500 TABLET, EXTENDED RELEASE ORAL EVERY OTHER DAY
Status: DISCONTINUED | OUTPATIENT
Start: 2021-03-03 | End: 2021-03-07 | Stop reason: HOSPADM

## 2021-03-02 RX ORDER — SODIUM CHLORIDE 0.9 % (FLUSH) 0.9 %
10 SYRINGE (ML) INJECTION PRN
Status: DISCONTINUED | OUTPATIENT
Start: 2021-03-02 | End: 2021-03-02 | Stop reason: HOSPADM

## 2021-03-02 RX ORDER — DIVALPROEX SODIUM 500 MG/1
500 TABLET, EXTENDED RELEASE ORAL EVERY EVENING
Status: DISCONTINUED | OUTPATIENT
Start: 2021-03-03 | End: 2021-03-07 | Stop reason: HOSPADM

## 2021-03-02 RX ORDER — NEOSTIGMINE METHYLSULFATE 1 MG/ML
INJECTION, SOLUTION INTRAVENOUS PRN
Status: DISCONTINUED | OUTPATIENT
Start: 2021-03-02 | End: 2021-03-02 | Stop reason: SDUPTHER

## 2021-03-02 RX ORDER — PROMETHAZINE HYDROCHLORIDE 25 MG/ML
6.25 INJECTION, SOLUTION INTRAMUSCULAR; INTRAVENOUS EVERY 10 MIN PRN
Status: DISCONTINUED | OUTPATIENT
Start: 2021-03-02 | End: 2021-03-02 | Stop reason: HOSPADM

## 2021-03-02 RX ORDER — LIDOCAINE HYDROCHLORIDE 20 MG/ML
INJECTION, SOLUTION INTRAVENOUS PRN
Status: DISCONTINUED | OUTPATIENT
Start: 2021-03-02 | End: 2021-03-02 | Stop reason: SDUPTHER

## 2021-03-02 RX ORDER — ACETAMINOPHEN 325 MG/1
650 TABLET ORAL EVERY 6 HOURS
Status: DISCONTINUED | OUTPATIENT
Start: 2021-03-02 | End: 2021-03-07 | Stop reason: HOSPADM

## 2021-03-02 RX ORDER — MORPHINE SULFATE 2 MG/ML
2 INJECTION, SOLUTION INTRAMUSCULAR; INTRAVENOUS EVERY 5 MIN PRN
Status: DISCONTINUED | OUTPATIENT
Start: 2021-03-02 | End: 2021-03-02

## 2021-03-02 RX ORDER — HYDROCODONE BITARTRATE AND ACETAMINOPHEN 5; 325 MG/1; MG/1
1 TABLET ORAL PRN
Status: DISCONTINUED | OUTPATIENT
Start: 2021-03-02 | End: 2021-03-02

## 2021-03-02 RX ORDER — SODIUM CHLORIDE 0.9 % (FLUSH) 0.9 %
10 SYRINGE (ML) INJECTION EVERY 12 HOURS SCHEDULED
Status: DISCONTINUED | OUTPATIENT
Start: 2021-03-02 | End: 2021-03-02 | Stop reason: HOSPADM

## 2021-03-02 RX ORDER — GLYCOPYRROLATE 1 MG/5 ML
SYRINGE (ML) INTRAVENOUS PRN
Status: DISCONTINUED | OUTPATIENT
Start: 2021-03-02 | End: 2021-03-02 | Stop reason: SDUPTHER

## 2021-03-02 RX ORDER — HYDROCODONE BITARTRATE AND ACETAMINOPHEN 5; 325 MG/1; MG/1
2 TABLET ORAL PRN
Status: DISCONTINUED | OUTPATIENT
Start: 2021-03-02 | End: 2021-03-02 | Stop reason: HOSPADM

## 2021-03-02 RX ORDER — OXYCODONE HYDROCHLORIDE 10 MG/1
10 TABLET ORAL EVERY 4 HOURS PRN
Status: DISCONTINUED | OUTPATIENT
Start: 2021-03-02 | End: 2021-03-07 | Stop reason: HOSPADM

## 2021-03-02 RX ORDER — ONDANSETRON 2 MG/ML
4 INJECTION INTRAMUSCULAR; INTRAVENOUS EVERY 6 HOURS PRN
Status: DISCONTINUED | OUTPATIENT
Start: 2021-03-02 | End: 2021-03-07 | Stop reason: HOSPADM

## 2021-03-02 RX ORDER — SODIUM CHLORIDE 0.9 % (FLUSH) 0.9 %
10 SYRINGE (ML) INJECTION PRN
Status: DISCONTINUED | OUTPATIENT
Start: 2021-03-02 | End: 2021-03-07 | Stop reason: HOSPADM

## 2021-03-02 RX ORDER — ACETAMINOPHEN 650 MG
TABLET, EXTENDED RELEASE ORAL PRN
Status: DISCONTINUED | OUTPATIENT
Start: 2021-03-02 | End: 2021-03-02 | Stop reason: ALTCHOICE

## 2021-03-02 RX ADMIN — HYDROMORPHONE HYDROCHLORIDE 0.5 MG: 1 INJECTION, SOLUTION INTRAMUSCULAR; INTRAVENOUS; SUBCUTANEOUS at 10:20

## 2021-03-02 RX ADMIN — ROCURONIUM BROMIDE 50 MG: 10 INJECTION, SOLUTION INTRAVENOUS at 07:29

## 2021-03-02 RX ADMIN — DEXTROSE MONOHYDRATE, SODIUM CHLORIDE, AND POTASSIUM CHLORIDE: 50; 4.5; 1.49 INJECTION, SOLUTION INTRAVENOUS at 22:00

## 2021-03-02 RX ADMIN — SODIUM CHLORIDE: 9 INJECTION, SOLUTION INTRAVENOUS at 08:56

## 2021-03-02 RX ADMIN — QUETIAPINE FUMARATE 50 MG: 50 TABLET, EXTENDED RELEASE ORAL at 21:59

## 2021-03-02 RX ADMIN — HYDROMORPHONE HYDROCHLORIDE 0.5 MG: 1 INJECTION, SOLUTION INTRAMUSCULAR; INTRAVENOUS; SUBCUTANEOUS at 09:56

## 2021-03-02 RX ADMIN — PROPOFOL 200 MG: 10 INJECTION, EMULSION INTRAVENOUS at 07:29

## 2021-03-02 RX ADMIN — SODIUM CHLORIDE: 9 INJECTION, SOLUTION INTRAVENOUS at 07:23

## 2021-03-02 RX ADMIN — LABETALOL HYDROCHLORIDE 5 MG: 5 INJECTION INTRAVENOUS at 08:03

## 2021-03-02 RX ADMIN — MORPHINE SULFATE 4 MG: 10 INJECTION INTRAVENOUS at 09:20

## 2021-03-02 RX ADMIN — ACETAMINOPHEN 650 MG: 325 TABLET, FILM COATED ORAL at 17:01

## 2021-03-02 RX ADMIN — HYDROMORPHONE HYDROCHLORIDE 0.5 MG: 1 INJECTION, SOLUTION INTRAMUSCULAR; INTRAVENOUS; SUBCUTANEOUS at 22:00

## 2021-03-02 RX ADMIN — ROCURONIUM BROMIDE 10 MG: 10 INJECTION, SOLUTION INTRAVENOUS at 08:35

## 2021-03-02 RX ADMIN — Medication 3 MG: at 09:04

## 2021-03-02 RX ADMIN — FENTANYL CITRATE 50 MCG: 50 INJECTION, SOLUTION INTRAMUSCULAR; INTRAVENOUS at 07:56

## 2021-03-02 RX ADMIN — Medication 0.6 MG: at 09:04

## 2021-03-02 RX ADMIN — DEXTROSE MONOHYDRATE, SODIUM CHLORIDE, AND POTASSIUM CHLORIDE: 50; 4.5; 1.49 INJECTION, SOLUTION INTRAVENOUS at 11:25

## 2021-03-02 RX ADMIN — SODIUM CHLORIDE: 9 INJECTION, SOLUTION INTRAVENOUS at 06:14

## 2021-03-02 RX ADMIN — LIDOCAINE HYDROCHLORIDE 80 MG: 20 INJECTION, SOLUTION INTRAVENOUS at 07:29

## 2021-03-02 RX ADMIN — FENTANYL CITRATE 100 MCG: 50 INJECTION, SOLUTION INTRAMUSCULAR; INTRAVENOUS at 07:29

## 2021-03-02 RX ADMIN — MIDAZOLAM 2 MG: 1 INJECTION INTRAMUSCULAR; INTRAVENOUS at 07:23

## 2021-03-02 RX ADMIN — HYDROMORPHONE HYDROCHLORIDE 0.5 MG: 1 INJECTION, SOLUTION INTRAMUSCULAR; INTRAVENOUS; SUBCUTANEOUS at 10:32

## 2021-03-02 RX ADMIN — CEFOXITIN 2 G: 2 INJECTION, POWDER, FOR SOLUTION INTRAVENOUS at 07:35

## 2021-03-02 RX ADMIN — HYDROMORPHONE HYDROCHLORIDE 0.5 MG: 1 INJECTION, SOLUTION INTRAMUSCULAR; INTRAVENOUS; SUBCUTANEOUS at 09:49

## 2021-03-02 RX ADMIN — ROCURONIUM BROMIDE 20 MG: 10 INJECTION, SOLUTION INTRAVENOUS at 08:15

## 2021-03-02 RX ADMIN — FENTANYL CITRATE 50 MCG: 50 INJECTION, SOLUTION INTRAMUSCULAR; INTRAVENOUS at 08:06

## 2021-03-02 RX ADMIN — FAMOTIDINE 20 MG: 20 TABLET ORAL at 21:59

## 2021-03-02 RX ADMIN — SODIUM CHLORIDE, PRESERVATIVE FREE 10 ML: 5 INJECTION INTRAVENOUS at 22:00

## 2021-03-02 RX ADMIN — ACETAMINOPHEN 650 MG: 325 TABLET, FILM COATED ORAL at 23:46

## 2021-03-02 RX ADMIN — HYDROMORPHONE HYDROCHLORIDE 0.5 MG: 1 INJECTION, SOLUTION INTRAMUSCULAR; INTRAVENOUS; SUBCUTANEOUS at 17:04

## 2021-03-02 RX ADMIN — OXYCODONE HYDROCHLORIDE 10 MG: 10 TABLET ORAL at 19:08

## 2021-03-02 RX ADMIN — FENTANYL CITRATE 25 MCG: 50 INJECTION, SOLUTION INTRAMUSCULAR; INTRAVENOUS at 08:55

## 2021-03-02 RX ADMIN — OXYCODONE 5 MG: 5 TABLET ORAL at 14:55

## 2021-03-02 RX ADMIN — MORPHINE SULFATE 4 MG: 10 INJECTION INTRAVENOUS at 09:25

## 2021-03-02 RX ADMIN — FENTANYL CITRATE 25 MCG: 50 INJECTION, SOLUTION INTRAMUSCULAR; INTRAVENOUS at 09:05

## 2021-03-02 RX ADMIN — ENOXAPARIN SODIUM 40 MG: 40 INJECTION SUBCUTANEOUS at 06:10

## 2021-03-02 RX ADMIN — MORPHINE SULFATE 2 MG: 2 INJECTION, SOLUTION INTRAMUSCULAR; INTRAVENOUS at 09:38

## 2021-03-02 RX ADMIN — ONDANSETRON HYDROCHLORIDE 4 MG: 2 INJECTION, SOLUTION INTRAMUSCULAR; INTRAVENOUS at 08:53

## 2021-03-02 ASSESSMENT — PAIN SCALES - GENERAL
PAINLEVEL_OUTOF10: 7
PAINLEVEL_OUTOF10: 6
PAINLEVEL_OUTOF10: 9
PAINLEVEL_OUTOF10: 6
PAINLEVEL_OUTOF10: 10
PAINLEVEL_OUTOF10: 2
PAINLEVEL_OUTOF10: 4
PAINLEVEL_OUTOF10: 8
PAINLEVEL_OUTOF10: 7

## 2021-03-02 ASSESSMENT — PULMONARY FUNCTION TESTS
PIF_VALUE: 28
PIF_VALUE: 3
PIF_VALUE: 28
PIF_VALUE: 4
PIF_VALUE: 16
PIF_VALUE: 4
PIF_VALUE: 28
PIF_VALUE: 13
PIF_VALUE: 25
PIF_VALUE: 26
PIF_VALUE: 0
PIF_VALUE: 4
PIF_VALUE: 16
PIF_VALUE: 1
PIF_VALUE: 25
PIF_VALUE: 21
PIF_VALUE: 25
PIF_VALUE: 25
PIF_VALUE: 1
PIF_VALUE: 23
PIF_VALUE: 28
PIF_VALUE: 21
PIF_VALUE: 25
PIF_VALUE: 24
PIF_VALUE: 22
PIF_VALUE: 19
PIF_VALUE: 25
PIF_VALUE: 0
PIF_VALUE: 9
PIF_VALUE: 23
PIF_VALUE: 28
PIF_VALUE: 16
PIF_VALUE: 16
PIF_VALUE: 13
PIF_VALUE: 25
PIF_VALUE: 21
PIF_VALUE: 28

## 2021-03-02 ASSESSMENT — PAIN DESCRIPTION - LOCATION
LOCATION: ABDOMEN

## 2021-03-02 ASSESSMENT — PAIN DESCRIPTION - DESCRIPTORS
DESCRIPTORS: SHARP
DESCRIPTORS: CONSTANT;DISCOMFORT;THROBBING
DESCRIPTORS: BURNING;DISCOMFORT;THROBBING
DESCRIPTORS: BURNING;DISCOMFORT;CONSTANT

## 2021-03-02 ASSESSMENT — PAIN DESCRIPTION - PAIN TYPE
TYPE: SURGICAL PAIN

## 2021-03-02 ASSESSMENT — PAIN DESCRIPTION - ONSET: ONSET: GRADUAL

## 2021-03-02 ASSESSMENT — PAIN DESCRIPTION - ORIENTATION
ORIENTATION: LOWER;MID
ORIENTATION: MID
ORIENTATION: MID
ORIENTATION: MID;LOWER

## 2021-03-02 ASSESSMENT — LIFESTYLE VARIABLES: SMOKING_STATUS: 1

## 2021-03-02 ASSESSMENT — PAIN - FUNCTIONAL ASSESSMENT: PAIN_FUNCTIONAL_ASSESSMENT: 0-10

## 2021-03-02 ASSESSMENT — PAIN DESCRIPTION - FREQUENCY: FREQUENCY: INTERMITTENT

## 2021-03-02 NOTE — OP NOTE
Operative Note      Patient: Doni Cortés  YOB: 1960  MRN: [id number]    Date of Procedure: 3/2/2021    Pre-Op Diagnosis: Unrestectable cecal polyps via colonoscope    Post-Op Diagnosis: Same       Surgery: Laparoscopic right hemicolectomy with stapled ileocolonic anastomosis    Surgeon(s):  Elio Dee MD    Assistant:   Resident: Kahty Ramos MD; Surinder Moody DO    Anesthesia: General    Estimated Blood Loss (mL): less than 50     Complications: None    Specimens:   ID Type Source Tests Collected by Time Destination   A : Right Colon Tissue Tissue SURGICAL PATHOLOGY Elio Dee MD 3/2/2021 6720        Implants:  * No implants in log *      Drains:   Urethral Catheter Non-latex 16 fr (Active)       Findings: right hemicolectomy with stapled ileocolic anastomosis    HISTORY: Doni Cortés is a 63 y.o. male presented after colonoscopy revealing multiple unresectable polyps in the cecum. Laparoscopic right hemicolectomy was recommended. The risks benefits and alternatives of the procedure were discussed with the patient who stated understanding and agreed to proceed. DESCRIPTION OF PROCEDURE: The patient was brought to the operating room and positioned supine on the OR table. Sequential compression devices were placed on the patient's lower extremities and functioning. Preoperative antibiotics were administered. Preop, patient received preoperative Lovenox. Anesthesia was obtained without complication as per the anesthesia record. Immediately prior to the procedure a time-out was called and the surgical checklist was reviewed and agreed upon by all present. The patient was prepped and draped in the usual sterile fashion. Using a 15 blade a vertical supraumbilical incision was made, veress needle placed and abdomen insufflated to 15mmHg. A 10mm optiview trocar was placed in this location. Two additional 5mm ports were placed in the suprapubic and left lower quadrants. The patient was positioned in trendelenburg and rotated to the left, a grasper and ligasure was used to open the peritoneum adjacent to the ileocolic pedicle. The vessels were isolated and sealed multiple times prior to transection with the ligasure. Blunt dissection under the mesentery of the right colon was undertaken using graspers and ligasure, the duodenum was identified and swept down. Once medial dissection was completed, we started our lateral dissection. The omentum was large and an additional 5mm port was placed in the left upper quadrant to aid in retraction. The omentum was  from the transverse colon and dissection of the lateral attachments of the right colon continued using ligasure. The lateral and medial dissection planes coalesced and the right colon was completely medialized. We then enlarged out 10mm port to accommodate a gelpoint. The gelpoint was placed and the colon extruded through this. Approximately 5cm proximal to the cecum the ileum was transected with a blue STELLA stapler. The transverse colon was transected at the middle colic in a similar fashion. The middle colic artery was palpable. Remaining mesenteric attachments were divided with ligasure. A stapled side to side ileocolonic anastomosis was performed using two loads of a blue STELLA stapler in the standard fashion. The bowel was reduced back into the abdominal cavity. The gelpoint cap was placed and pneumoperitoneum re-established. The abdomen was inspected- the anastomosis was intact without twisting. The abdomen was desufflated and all ports removed. The midline fascia closed with two 0-looped PDS in the standard fashion. Skin was closed with monocryl. The skin was washed and dried and skin glue applied to all incisions. Needle, sponge, and instrument counts were reported as correct x2. Dr. Jann Ravi was present and scrubbed throughout the case. The patient tolerated the procedure well without complications.  He was transferred to the recovery area in good condition.     Electronically signed by Ruiz Patel DO on 3/2/21 at 9:58 AM RODNEY Najera MD

## 2021-03-02 NOTE — BRIEF OP NOTE
Brief Postoperative Note      Patient: Tabby Bonlila  YOB: 1960  MRN: [id number]    Date of Procedure: 3/2/2021    Pre-Op Diagnosis: unresectable cecal polyps    Post-Op Diagnosis: Same       Procedure(s):  RIGHT HEMICOLECTOMY LAPAROSCOPIC POSS.  OPEN    Surgeon(s):  Lizzeth Gonzalez MD    Assistant:  Resident: Teo Sweeney MD; Naman Ruiz DO    Anesthesia: General    Estimated Blood Loss (mL): less than 50     Complications: None    Specimens:   ID Type Source Tests Collected by Time Destination   A : Right Colon Tissue Tissue SURGICAL PATHOLOGY Lizzeth Gonzalez MD 3/2/2021 2107        Implants:  * No implants in log *      Drains:   Urethral Catheter Non-latex 16 fr (Active)       Findings: right hemicolectomy with stapled ileocolic anastomosis     Electronically signed by Naman Ruiz DO on 3/2/2021 at 9:17 AM

## 2021-03-02 NOTE — DISCHARGE SUMMARY
Physician Discharge Summary     Patient ID:  Julito Lovett  [id number]  18 y.o.  1960    Admit date: 3/2/2021    Discharge date and time: 3/7/21    Admitting Physician: Kolby Pena MD     Admission Diagnoses: S/P laparoscopic colectomy [Z90.49]    Discharge Diagnoses: Active Problems:    S/P laparoscopic colectomy    COVID-19  Resolved Problems:    * No resolved hospital problems. *      Admission Condition: good    Discharged Condition: stable    Indication for Admission: sp laparoscopic hemicolectomy     Hospital Course/Procedures/Operation/treatments:   3/2 Lap R mitzy  3/3: Pain not controlled, educated on asking for pain medications. UOP adequate, plan to remove headley. No bowel fxn, continue NPO with ice chips and sips with meds. Added robaxin for muscle spasms. 3/4: no bowel function, tolerating clears, ambulating, SMI 1750  3/5 still no bowel function. Vomit x1 triggered by medicine taste, but tolerating CLD, asked patient to limit his PO intake and ambulate  3/6- doing well. Having BM. No nausea or vomiting. Pain is under control. Tolerating clears. Will possible advance diet today   3/7: no nausea/vomiting. Pain greatly improved, not using narcotics. +gas/stool. Nontender, incisions c/d/i. Consults:   IP CONSULT TO CASE MANAGEMENT    Significant Diagnostic Studies:   No results found. Discharge Exam:  GENERAL:  No acute distress. Alert and oriented. HEAD:  Normocephalic, atraumatic. EYES:  No scleral icterus. Conjugate gaze. ENT/NECK:  Trachea midline, mucous membranes moist.   LUNGS:  No cough. Nonlabored breathing on room air. CARDIOVASC:  Normal rate, no cyanosis. ABDOMEN:  Soft, non-distended, non-tender. Incisions c/d/i. No guarding / rigidity / rebound. LIMBS:  No deformities, no edema. NEURO:  Face symmetric, moves all extremities  SKIN:  Warm, dry.        Disposition: home    In process/preliminary results:  Outstanding Order Results     Date and Time Order Name Status Description    3/2/2021 0751 Surgical Pathology In process           Patient Instructions:   Current Discharge Medication List           Details   oxyCODONE (ROXICODONE) 5 MG immediate release tablet Take 1 tablet by mouth every 6 hours as needed for Pain for up to 7 days. Intended supply: 7 days. Take lowest dose possible to manage pain  Qty: 20 tablet, Refills: 0    Comments: Reduce doses taken as pain becomes manageable  Associated Diagnoses: S/P laparoscopic colectomy      acetaminophen (TYLENOL) 500 MG tablet Take 2 tablets by mouth 3 times daily for 10 days  Qty: 60 tablet, Refills: 0      ibuprofen (ADVIL;MOTRIN) 600 MG tablet Take 1 tablet by mouth 4 times daily as needed for Pain  Qty: 40 tablet, Refills: 0      ondansetron (ZOFRAN) 4 MG tablet Take 1 tablet by mouth daily as needed for Nausea or Vomiting  Qty: 12 tablet, Refills: 0      docusate sodium (COLACE) 100 MG capsule Take 1 capsule by mouth 2 times daily  Qty: 50 capsule, Refills: 0      methocarbamol (ROBAXIN) 750 MG tablet Take 1 tablet by mouth 4 times daily for 10 days  Qty: 40 tablet, Refills: 0              Details   divalproex (DEPAKOTE ER) 500 MG extended release tablet Take 500 mg by mouth 2 times daily Take one tablet by mouth every morning and take one tablet at bedtime for mood      MENTHOL-METHYL SALICYLATE EX Apply topically Cream. Apply small amount to affected area three times a day if needed for pain      QUEtiapine (SEROQUEL XR) 50 MG extended release tablet Take 50 mg by mouth nightly Take one tablet by mouth at bedtime for mood.       simvastatin (ZOCOR) 20 MG tablet Take 20 mg by mouth nightly Take one tablet by mouth at bedtime for cholesterol             [address]  Additional Discharge Instructions    Call Dr. Trisha Conway for an appointment in 2 weeks    ACTIVITY INSTRUCTIONS:  [x]Increase activity as tolerated    [x]No heavy lifting or strenuous activity: no lifting more than 10lbs or a gallon of experienced after discharge. Increase fluid intake and fiber in your diet. We recommend all patients start taking stool softeners (such as Metamucil®) two to three times per day as directed on the label while you are still taking narcotic pain medicine. Don't wait until you are constipated to start. Also, increase physical activity such as walking. Over the counter stool softeners, laxatives, etc:   Miralax   Milk of magnesia   Benefiber, Fibercon, citrucel   Colace (docusate)   Dulcolax   Senokot (senna)    Sleeping Problems  Occasionally patients will have problems sleeping at night after discharge home. Be sure you are not napping too much during the day. Naps should be limited to a total of one hour per day. Be sure to exercise by walking frequently, especially outside of your house, so you are tired and sleepy by evening. Finally, most people find that just before bedtime taking a warm bath and drinking some milk products (milk, milkshake, ice cream, etc.) will relax them, making them drowsy and promote a good night sleep. Taking a dose of pain medication just before bedtime also helps, especially if the reason for sleeping poorly is incisional pain. Fever  Take your temperature in the evening if you feel feverish especially in the first couple of weeks you are home. Call us if you have a temperature over 100.4°F degrees. Many patients experience night sweats early after surgery that produce enough perspiration to dampen your bedclothes. They are not usually caused by a fever. Although frustrating and worrisome, these sweats are of no significance and will resolve on their own; however, check your temperature if they occur. Leg Swelling  For the first 4?6 weeks following surgery, many patients notice swelling of their ankles and sometimes their lower legs. This is usually the result of sitting for longer periods of time with their legs hanging down.  This problem is quite preventable if you sit addition, you should drink plenty of fluids like water and juice, to help keep your bowels moving. Activities  A sensible balance of rest and activity is the key to recovery. Keep in mind that you will have good days when you seem to have a lot of energy, and days when you feel as though you are backsliding. This is normal convalescence. Try not to overextend yourself and slowly increase your activities every day. Household Tasks and Lifting  Although you may perform light tasks around the house, you should stop if you tire or have pain. You should lift no more than 10 pounds for about 6 weeks following surgery. Lifting may also include such tasks as vacuuming and lawn?mowing, so be sensible. Exercise  Walking is the single best exercise for anyone. Beginning upon your discharge from the hospital, we recommend that you do it daily, preferably two to three times per day. You should start by going short distances and gradually work your way up to a mile or more per day. If the weather outside is rainy or too hot, most [facility] have air conditioned shopping malls that are perfect for walking. Going for walks outside of your home is preferable as it will improve your self?confidence, feeling of well?being, and generally hasten recovery. We encourage an early return to normal activity. Sports  You should not engage in any strenuous sports such as weight?lifting, tennis or jogging, for the first 6 weeks after surgery. Then, restart these sports gradually. Other activities such as walking or riding a stationary bicycle are accepted and encouraged. Golf is initially restricted to some extent after surgery. Putting practice may be started as soon as you are comfortable. After 2?3 weeks you may start chipping practice. However, you should wait 6 weeks before beginning drives, and then start at a driving range. It may be 8 weeks before you are playing a full 9 or 18 holes of golf.     Driving  Driving should are healing, and it is very strongly recommended that you stop smoking entirely. You must avoid tobacco use to preserve lung function. Avoid second hand smoke also. If you have had surgical removal of a lung cancer, continued smoking substantially increases the risk of a second lung cancer developing later. We will be very happy to refer you to a smoking cessation program to help you remain tobacco free. Cold and Flu Exposure  You should avoid large crowds or anyone with a known cold or flu until 6?8 weeks following your surgery. Your immune system is minimally depressed for a few weeks after surgery, but will return to normal by 4?6 weeks. If you develop a cough with significant phlegm production during this period, contact your surgeon. Flu Shot/ Pneumonia Shot  Many people ask about getting a flu shot. We recommend that you obtain a flu shot yearly. However, you should ask your family doctor or oncologist first. Also, inquire about a pneumonia shot (Pneumovax®), which is given once every five years. Emergencies  If you develop a sudden, serious and apparently life-threatening problem such as severe shortness of breath, chest pain suggesting angina or a heart attack, immediately call 911 for an ambulance and go directly to the nearest emergency room. After you are stabilized, your emergency room doctor may call your surgeon to inquire about more information about your medical condition. JUST FOR WOMEN  Edgarothyteofilo Primer (if you have an incision on your chest or back)  Wearing support undergarments for the first few weeks can put an uncomfortable amount of pressure on the incision. Many women have found that using an athletic support bra is very comfortable. You should buy the bra two sizes larger around the chest than your normally wear. The cup size will remain the same. Look for a bra that hooks in the front for ease of application.   Menses  Women who have not reached menopause may find their periods disrupted by the stress of surgery. This is not uncommon, and should resolve on its own within a month or two. If it has not returned to normal by that time, you should see your gynecologist for an evaluation. Hormone Replacement  If you are on a hormone replacement regimen, you should continue to take them as directed unless otherwise instructed by your surgeon.           Follow up:   Garlan Bamberger, MD  [address] [facility]  [phone]    Schedule an appointment as soon as possible for a visit in 2 weeks         Signed:  Terri Villafuerte MD  3/7/2021  8:25 AM

## 2021-03-02 NOTE — PROGRESS NOTES
Physical Therapy  Physical Therapy Initial Assessment     Name: Julito Lovett  : 1960  MRN: [id number]    Referring Provider:  Mar Thomas DO    Date of Service: 3/2/2021    Evaluating PT:  Zackery Washington PT, DPT. [id number]    Room #:  9913/6903-Q  Diagnosis:  Laparoscopic colectomy   Reason for admission:  Elective surgery   Precautions:  Falls, psych hx   Procedures: 3/2 R hemicolectomy   Equipment Recommendations:  None    SUBJECTIVE:  Pt lives alone in a bottom level apartment with 5 steps 2 rails down to enter. Ambulating with no device. OBJECTIVE:   Initial Evaluation  Date: 3/2 Treatment   Short Term/ Long Term   Goals   AM-PAC 6 Clicks      Was pt agreeable to Eval/treatment? Yes      Does pt have pain?  2/10 abdominal      Bed Mobility  Rolling: NT  Supine to sit: [place]  Sit to supine: SBA  Scooting: SBA  Independent    Transfers Sit to stand: SBA  Stand to sit: SBA  Stand pivot: NT  Independent    Ambulation    85 feet with IV pole SBA    >200 feet with no AD independent   Stair negotiation: ascended and descended  NT  5 steps with 1 rail Mod I    ROM BUE:  See OT eval   BLE:  WFL     Strength BUE:  See OT eval   BLE:  knee ext 5/5  Ankle DF 5/5  Increase by 1/3 MMT grade    Balance Sitting EOB:  Independent  Dynamic Standing:  SBA  Sitting EOB:  indep  Dynamic Standing:  indep     -Pt is A & O x 4  -Sensation:  unremarkable   -Edema:  unremarkable     Therapeutic Exercises:  functional activity     Patient education  Pt educated on safety, sequencing of transfers, and role of PT    Patient response to education:   Pt verbalized understanding Pt demonstrated skill Pt requires further education in this area   Yes  Yes  Yes      ASSESSMENT:    Comments:  Pt received supine and agreeable to PT session Pt moving slowly and c/o mild dizziness with activity but did well only needing light assist to transfer out to EOB d/t abdominal pain. Pt c/o slight pain increase with activity. Ambulation completed multiples laps out/back into room with pt holding IV pole for support. Gait slow but steady. Returned to bed to end session. Pt with all needs met and call light in reach. Pt would benefit from continued PT POC to address functional deficits described above. Treatment:  Patient practiced and was instructed in the following treatment:    ? Patient education provided continuously throughout session for sequencing, safety maintenance, and improving any deficits found during the evaluation. ? Bed mobility training - pt given verbal and tactile cues to facilitate proper sequencing and safety during rolling and supine>sit as well as provided with physical assistance to complete task    ? STS and pivot transfer training - pt educated on proper hand and foot placement, safety and sequencing, and use of proper technique to safely complete sit<>stand and pivot transfers with hands on assistance to complete task safely   · Gait training- pt was given verbal and tactile cues to facilitate normal pattern and speed during ambulation     Pt's/ family goals   1. Return home     Patient and or family understand(s) diagnosis, prognosis, and plan of care. Yes     PLAN:    Current Treatment Recommendations   [] Strengthening     [] ROM   [x] Balance Training   [x] Endurance Training   [x] Transfer Training   [x] Gait Training   [x] Stair Training   [] Positioning   [x] Safety and Education Training   [] Patient/Caregiver Education   [] HEP  [] Other     Frequency of treatments: 2-5x/week x 1-2 weeks.     Time in  1420  Time out  1440    Total Treatment Time 10 minutes Evaluation Time includes thorough review of current medical information, gathering information on past medical history/social history and prior level of function, completion of standardized testing/informal observation of tasks, assessment of data and education on plan of care and goals.     CPT codes:  [x] Low Complexity PT evaluation 08896  [] Moderate Complexity PT evaluation 14555  [] High Complexity PT evaluation 87221  [] PT Re-evaluation 97434  [] Gait training 51198 - minutes  [] Manual therapy 99078 - minutes  [x] Therapeutic activities 39140 10 minutes  [] Therapeutic exercises 97978 - minutes  [] Neuromuscular reeducation 39729 - minutes     Kaden Barney, PT, DPT  [id number]

## 2021-03-02 NOTE — ANESTHESIA POSTPROCEDURE EVALUATION
Department of Anesthesiology  Postprocedure Note    Patient: Gadiel Cabrales  MRN: [id number]  YOB: 1960  Date of evaluation: 3/2/2021  Time:  10:58 AM     Procedure Summary     Date: 03/02/21 Room / Location: [place] [address] / [facility]    Anesthesia Start: 9865 Anesthesia Stop:     Procedure: RIGHT HEMICOLECTOMY LAPAROSCOPIC POSS. OPEN (Right Abdomen) Diagnosis: (HYPERPLASTIC POLYP)    Surgeons: Tin Andersen MD Responsible Provider: Sada Braun MD    Anesthesia Type: general ASA Status: 3          Anesthesia Type: general    Cristian Phase I: Cristian Score: 9    Cristian Phase II:      Last vitals: Reviewed and per EMR flowsheets.        Anesthesia Post Evaluation    Patient location during evaluation: PACU  Patient participation: complete - patient participated  Level of consciousness: awake  Pain score: 3  Airway patency: patent  Nausea & Vomiting: no nausea and no vomiting  Complications: no  Cardiovascular status: blood pressure returned to baseline  Respiratory status: acceptable  Hydration status: euvolemic

## 2021-03-02 NOTE — ANESTHESIA PRE PROCEDURE
Department of Anesthesiology  Preprocedure Note       Name:  Gladys Leon   Age:  63 y.o.  :  1960                                          MRN:  [id number]         Date:  3/2/2021      Surgeon: Henrietta Turner):  Maria G Camejo MD    Procedure: Procedure(s):  RIGHT HEMICOLECTOMY LAPAROSCOPIC POSS. OPEN    Medications prior to admission:   Prior to Admission medications    Medication Sig Start Date End Date Taking? Authorizing Provider   divalproex (DEPAKOTE ER) 500 MG extended release tablet Take 500 mg by mouth 2 times daily Take one tablet by mouth every morning and take one tablet at bedtime for mood   Yes Historical Provider, MD   MENTHOL-METHYL SALICYLATE EX Apply topically Cream. Apply small amount to affected area three times a day if needed for pain   Yes Historical Provider, MD   QUEtiapine (SEROQUEL XR) 50 MG extended release tablet Take 50 mg by mouth nightly Take one tablet by mouth at bedtime for mood.    Yes Historical Provider, MD   simvastatin (ZOCOR) 20 MG tablet Take 20 mg by mouth nightly Take one tablet by mouth at bedtime for cholesterol   Yes Historical Provider, MD       Current medications:    Current Facility-Administered Medications   Medication Dose Route Frequency Provider Last Rate Last Admin    sodium chloride flush 0.9 % injection 10 mL  10 mL Intravenous 2 times per day Maria G Camejo MD        sodium chloride flush 0.9 % injection 10 mL  10 mL Intravenous PRN Maria G Camejo MD        cefOXitin (MEFOXIN) 2000 mg in dextrose 5% 50 mL (mini-bag)  2,000 mg Intravenous On Call to Carlos Mitchell MD        0.9 % sodium chloride infusion   Intravenous Continuous Victorino Simmons  mL/hr at 21 0851 New Bag at 21 0614       Allergies:  No Known Allergies    Problem List:    Patient Active Problem List   Diagnosis Code    MDD (major depressive disorder) F32.9    Hypertension I10    Hyperlipidemia E78.5    History of colon polyps Z86.010 Past Medical History:        Diagnosis Date    Anxiety     Bipolar disorder ([facility] [address]     Hyperlipidemia     Hypertension     Major depressive disorder     PTSD (post-traumatic stress disorder)        Past Surgical History:        Procedure Laterality Date    APPENDECTOMY  2018    laparoscopic, acute appenditis, [address], [address] COLONOSCOPY  2015    approximately 2015, per patient had polyps removed but no report available for review, pt does not remember where it was done at or who did it    COLONOSCOPY  10/05/2020    Multiple right and left-sided colon polyps partial removed with polypectomy and biopsy cauterization, Dr. Haroldine Carrel, [address] COLONOSCOPY N/A 10/5/2020    COLONOSCOPY POLYPECTOMY SNARE/COLD BIOPSY performed by Tyler Ambriz MD at [address] COLONOSCOPY N/A 10/5/2020    COLONOSCOPY POLYPECTOMY ABLATION performed by Tyler Ambriz MD at [address] [address] x 2       Social History:    Social History     Tobacco Use    Smoking status: Current Every Day Smoker     Packs/day: 1.00     Years: 40.00     Pack years: 40.00     Types: Cigarettes    Smokeless tobacco: Never Used   Substance Use Topics    Alcohol use:  Yes     Alcohol/week: 3.0 standard drinks     Types: 3 Cans of beer per week     Comment: Thursdays - about 3 beers                                 Ready to quit: Not Answered  Counseling given: Not Answered      Vital Signs (Current):   Vitals:    03/02/21 0543   BP: (!) 131/91   Pulse: 84   Resp: 18   Temp: 36.3 °C (97.4 °F)   TempSrc: Temporal   SpO2: 96%   Weight: 225 lb (102.1 kg)   Height: 5' 9\" (1.753 m)                                              BP Readings from Last 3 Encounters:   03/02/21 (!) 131/91   02/25/21 122/89   02/18/21 (!) 152/93       NPO Status: Time of last liquid consumption: 1800                        Time of last solid consumption: 1800                        Date of last liquid consumption: 03/01/21 Date of last solid food consumption: 21    BMI:   Wt Readings from Last 3 Encounters:   21 225 lb (102.1 kg)   21 224 lb (101.6 kg)   21 225 lb (102.1 kg)     Body mass index is 33.23 kg/m². CBC:   Lab Results   Component Value Date    WBC 5.8 2021    RBC 4.83 2021    HGB 15.4 2021    HCT 46.1 2021    MCV 95.4 2021    RDW 13.5 2021     2021       CMP:   Lab Results   Component Value Date     2014    K 3.7 2014    CL 98 2014    CO2 25 2014    BUN 12 2014    CREATININE 0.7 2014    LABGLOM >60 2014    GLUCOSE 117 2014    PROT 7.5 2014    CALCIUM 9.7 2014    BILITOT 0.5 2014    ALKPHOS 85 2014    AST 16 2014    ALT 17 2014       POC Tests: No results for input(s): POCGLU, POCNA, POCK, POCCL, POCBUN, POCHEMO, POCHCT in the last 72 hours.     Coags: No results found for: PROTIME, INR, APTT    HCG (If Applicable): No results found for: PREGTESTUR, PREGSERUM, HCG, HCGQUANT     ABGs: No results found for: PHART, PO2ART, GVX8FXL, WFL2PWV, BEART, E3DCOJRU     Type & Screen (If Applicable):  No results found for: LABABO, LABRH    Drug/Infectious Status (If Applicable):  No results found for: HIV, HEPCAB    COVID-19 Screening (If Applicable):   Lab Results   Component Value Date    COVID19 Not Detected 2021     EK21  Ventricular Rate 78  BPM Final 2021 10:43 AM [facility]   Atrial Rate 78  BPM Final 2021 10:43 AM [facility]   P-R Interval 168  ms Final 2021 10:43 AM [facility]   QRS Duration 132  ms Final 2021 10:43 AM [facility]   Q-T Interval 390  ms Final 2021 10:43 AM [facility]   QTc Calculation (Bazett) 444  ms Final 2021 10:43 AM [facility]   P Axis 49  degrees Final 2021 10:43 AM [facility]   R [place] 14  degrees Final 2021 10:43 AM [facility] T Axis 35  degrees Final 02/25/2021 10:43 AM [facility]   Testing Performed By    Lab - Abbreviation Name Director Address Valid Date Range   360-[facility] MUSE Unknown Unknown 04/18/16 0721-Present   Narrative & Impression    Normal sinus rhythm  Right bundle branch block  Abnormal ECG  No previous ECGs available  Confirmed by Amanda Lafleur ([id number]) on 2/25/2021 6:33:13 PM         Anesthesia Evaluation  Patient summary reviewed and Nursing notes reviewed no history of anesthetic complications:   Airway: Mallampati: II  TM distance: >3 FB   Neck ROM: full  Mouth opening: > = 3 FB Dental:          Pulmonary: breath sounds clear to auscultation  (+) current smoker          Patient smoked on day of surgery. Cardiovascular:  Exercise tolerance: good (>4 METS),   (+) hypertension:, hyperlipidemia      ECG reviewed  Rhythm: regular  Rate: normal           Beta Blocker:  Not on Beta Blocker         Neuro/Psych:   (+) psychiatric history (bipolar; PTSD; MDD): stable with treatmentdepression/anxiety             GI/Hepatic/Renal:             Endo/Other:    (+) : arthritis: OA., .                 Abdominal:           Vascular:                                      Anesthesia Plan      general     ASA 3     (# 20 LEFT HAND)  Induction: intravenous. MIPS: Postoperative opioids intended and Prophylactic antiemetics administered. Anesthetic plan and risks discussed with patient. Use of blood products discussed with patient whom consented to blood products. Plan discussed with CRNA and attending. CORINE Ibrahim  3/2/2021      Pt seen, examined, chart reviewed, plan discussed.   Daksha Tiwari  3/2/2021  7:43 AM

## 2021-03-02 NOTE — H&P
GENERAL SURGERY  HISTORY AND PHYSICAL             Date: 3/2/2021        Patient Name: Lamont Norris     YOB: 1960      Age:  63 y.o. Chief Complaint   Residual cecal polyps, elective R hemicolectomy    History Obtained From   patient, electronic medical record    History of Present Illness   Lamont Norris is a 63 y.o. male with HTN and laparoscopic appendectomy who presents for elective R hemicolectomy because of polyps in cecum unable to be removed. Per Dr Stephen Pablo note on 2/18/21: \"57 yo patient of Dr Kris Arora. Dr Floresita Hernandez performed a colonoscopy on 10/5/2020 and he found 10 different polyps. 5 polyps were in the cecum but 2 were unable to be removed. The other 5 in the splenic flexure and sigmoid were removed. \"    Patient has some blood when wiping after BM this morning but he has hemorrhoids and otherwise has not seen blood in his stool. Denies any other changes in H&P since last seeing Dr Trisha Conway in clinic 2/18/21. No recent illness. No blood thinners. Completed Alicia's prep.     Past Medical History     Past Medical History:   Diagnosis Date    Anxiety     Bipolar disorder ([facility] [address]     Hyperlipidemia     Hypertension     Major depressive disorder     PTSD (post-traumatic stress disorder)         Past Surgical History     Past Surgical History:   Procedure Laterality Date    APPENDECTOMY  2018    laparoscopic, acute appenditis, [facility], [address] [place] [address]  2015    approximately 2015, per patient had polyps removed but no report available for review, pt does not remember where it was done at or who did it    COLONOSCOPY  10/05/2020    Multiple right and left-sided colon polyps partial removed with polypectomy and biopsy cauterization, Dr. Floresita Hernandez, [address] COLONOSCOPY N/A 10/5/2020    COLONOSCOPY POLYPECTOMY SNARE/COLD BIOPSY performed by Zoie Melissa MD at [address] COLONOSCOPY N/A 10/5/2020 COLONOSCOPY POLYPECTOMY ABLATION performed by Brett Fraga MD at [facility] x 2        Medications Prior to Admission     Prior to Admission medications    Medication Sig Start Date End Date Taking? Authorizing Provider   divalproex (DEPAKOTE ER) 500 MG extended release tablet Take 500 mg by mouth 2 times daily Take one tablet by mouth every morning and take one tablet at bedtime for mood   Yes Historical Provider, MD   MENTHOL-METHYL SALICYLATE EX Apply topically Cream. Apply small amount to affected area three times a day if needed for pain   Yes Historical Provider, MD   QUEtiapine (SEROQUEL XR) 50 MG extended release tablet Take 50 mg by mouth nightly Take one tablet by mouth at bedtime for mood. Yes Historical Provider, MD   simvastatin (ZOCOR) 20 MG tablet Take 20 mg by mouth nightly Take one tablet by mouth at bedtime for cholesterol   Yes Historical Provider, MD        Allergies   Patient has no known allergies. Social History     Social History     Tobacco History     Smoking Status  Current Every Day Smoker Smoking Frequency  1 pack/day for 40 years (40 pk yrs) Smoking Tobacco Type  Cigarettes    Smokeless Tobacco Use  Never Used          Alcohol History     Alcohol Use Status  Yes Drinks/Week  3 Cans of beer per week Amount  3.0 standard drinks of alcohol/wk Comment  Thursdays - about 3 beers           Drug Use     Drug Use Status  No          Sexual Activity     Sexually Active  Not Asked                Family History     Family History   Problem Relation Age of Onset    Kidney Disease Mother     Heart Attack Father     Heart Disease Father        Review of Systems   Pertinent ROS listed in HPI, all others negative    Physical Exam   BP (!) 131/91   Pulse 84   Temp 97.4 °F (36.3 °C) (Temporal)   Resp 18   Ht 5' 9\" (1.753 m)   Wt 225 lb (102.1 kg)   SpO2 96%   BMI 33.23 kg/m²     GENERAL:  No acute distress. Alert and oriented. HEAD:  Normocephalic, atraumatic. EYES:  No scleral icterus. Conjugate gaze. ENT/NECK:  Trachea midline, mask on mouth. LUNGS:  No cough. Nonlabored breathing on room air. CARDIOVASC:  Normal rate, no cyanosis. ABDOMEN:  Soft, non-distended, non-tender. Lap appe scar. No guarding / rigidity / rebound. LIMBS:  No deformities, no edema. NEURO:  Face symmetric, moves all extremities  SKIN:  Warm, dry. Labs    CBC  No results for input(s): WBC, HGB, HCT, PLT in the last 72 hours. BMP  No results for input(s): NA, K, CL, CO2, BUN, CREATININE, CALCIUM in the last 72 hours. Invalid input(s): GLU  Liver Function  No results for input(s): AMYLASE, LIPASE, BILITOT, BILIDIR, AST, ALT, ALKPHOS, PROT, LABALBU in the last 72 hours. No results for input(s): LACTATE in the last 72 hours. No results for input(s): INR, PTT in the last 72 hours. Invalid input(s): PT    Imaging/Diagnostics Last 24 Hours   No results found. Assessment        63 y.o. male with residual cecal polyps    Plan   - R hemicolectomy today with Dr Trisha Conway. All questions answered.       Electronically signed by Viola Workman MD on 3/2/21 at 7:03 AM EST

## 2021-03-02 NOTE — PROGRESS NOTES
Patient came to surgical suite with glasses. Placed in bag with label on them will return to patient after surgery.

## 2021-03-03 LAB
ANION GAP SERPL CALCULATED.3IONS-SCNC: 9 MMOL/L (ref 7–16)
BASOPHILS ABSOLUTE: 0.03 E9/L (ref 0–0.2)
BASOPHILS RELATIVE PERCENT: 0.4 % (ref 0–2)
BUN BLDV-MCNC: 9 MG/DL (ref 6–20)
CALCIUM SERPL-MCNC: 8.3 MG/DL (ref 8.6–10.2)
CHLORIDE BLD-SCNC: 103 MMOL/L (ref 98–107)
CO2: 24 MMOL/L (ref 22–29)
CREAT SERPL-MCNC: 0.7 MG/DL (ref 0.7–1.2)
EOSINOPHILS ABSOLUTE: 0.08 E9/L (ref 0.05–0.5)
EOSINOPHILS RELATIVE PERCENT: 1.2 % (ref 0–6)
GFR AFRICAN AMERICAN: >60
GFR NON-AFRICAN AMERICAN: >60 ML/MIN/1.73
GLUCOSE BLD-MCNC: 121 MG/DL (ref 74–99)
HCT VFR BLD CALC: 42.2 % (ref 37–54)
HEMOGLOBIN: 14 G/DL (ref 12.5–16.5)
IMMATURE GRANULOCYTES #: 0.02 E9/L
IMMATURE GRANULOCYTES %: 0.3 % (ref 0–5)
LYMPHOCYTES ABSOLUTE: 1.59 E9/L (ref 1.5–4)
LYMPHOCYTES RELATIVE PERCENT: 23.2 % (ref 20–42)
MAGNESIUM: 1.9 MG/DL (ref 1.6–2.6)
MCH RBC QN AUTO: 32.3 PG (ref 26–35)
MCHC RBC AUTO-ENTMCNC: 33.2 % (ref 32–34.5)
MCV RBC AUTO: 97.5 FL (ref 80–99.9)
MONOCYTES ABSOLUTE: 0.7 E9/L (ref 0.1–0.95)
MONOCYTES RELATIVE PERCENT: 10.2 % (ref 2–12)
NEUTROPHILS ABSOLUTE: 4.42 E9/L (ref 1.8–7.3)
NEUTROPHILS RELATIVE PERCENT: 64.7 % (ref 43–80)
PDW BLD-RTO: 13.5 FL (ref 11.5–15)
PHOSPHORUS: 1.8 MG/DL (ref 2.5–4.5)
PLATELET # BLD: 168 E9/L (ref 130–450)
PMV BLD AUTO: 10 FL (ref 7–12)
POTASSIUM SERPL-SCNC: 4 MMOL/L (ref 3.5–5)
RBC # BLD: 4.33 E12/L (ref 3.8–5.8)
SODIUM BLD-SCNC: 136 MMOL/L (ref 132–146)
WBC # BLD: 6.8 E9/L (ref 4.5–11.5)

## 2021-03-03 PROCEDURE — 97165 OT EVAL LOW COMPLEX 30 MIN: CPT

## 2021-03-03 PROCEDURE — 2500000003 HC RX 250 WO HCPCS: Performed by: STUDENT IN AN ORGANIZED HEALTH CARE EDUCATION/TRAINING PROGRAM

## 2021-03-03 PROCEDURE — 85025 COMPLETE CBC W/AUTO DIFF WBC: CPT

## 2021-03-03 PROCEDURE — 80048 BASIC METABOLIC PNL TOTAL CA: CPT

## 2021-03-03 PROCEDURE — 6370000000 HC RX 637 (ALT 250 FOR IP): Performed by: STUDENT IN AN ORGANIZED HEALTH CARE EDUCATION/TRAINING PROGRAM

## 2021-03-03 PROCEDURE — 2580000003 HC RX 258: Performed by: NURSE PRACTITIONER

## 2021-03-03 PROCEDURE — 2500000003 HC RX 250 WO HCPCS: Performed by: NURSE PRACTITIONER

## 2021-03-03 PROCEDURE — 84100 ASSAY OF PHOSPHORUS: CPT

## 2021-03-03 PROCEDURE — 36415 COLL VENOUS BLD VENIPUNCTURE: CPT

## 2021-03-03 PROCEDURE — 97530 THERAPEUTIC ACTIVITIES: CPT

## 2021-03-03 PROCEDURE — 1200000000 HC SEMI PRIVATE

## 2021-03-03 PROCEDURE — 99024 POSTOP FOLLOW-UP VISIT: CPT | Performed by: SURGERY

## 2021-03-03 PROCEDURE — 83735 ASSAY OF MAGNESIUM: CPT

## 2021-03-03 PROCEDURE — 6360000002 HC RX W HCPCS: Performed by: STUDENT IN AN ORGANIZED HEALTH CARE EDUCATION/TRAINING PROGRAM

## 2021-03-03 RX ORDER — METHOCARBAMOL 750 MG/1
750 TABLET, FILM COATED ORAL 4 TIMES DAILY
Status: DISCONTINUED | OUTPATIENT
Start: 2021-03-03 | End: 2021-03-07 | Stop reason: HOSPADM

## 2021-03-03 RX ADMIN — DEXTROSE MONOHYDRATE, SODIUM CHLORIDE, AND POTASSIUM CHLORIDE: 50; 4.5; 1.49 INJECTION, SOLUTION INTRAVENOUS at 23:50

## 2021-03-03 RX ADMIN — DEXTROSE MONOHYDRATE, SODIUM CHLORIDE, AND POTASSIUM CHLORIDE: 50; 4.5; 1.49 INJECTION, SOLUTION INTRAVENOUS at 08:07

## 2021-03-03 RX ADMIN — ACETAMINOPHEN 650 MG: 325 TABLET, FILM COATED ORAL at 12:16

## 2021-03-03 RX ADMIN — DIVALPROEX SODIUM 500 MG: 500 TABLET, EXTENDED RELEASE ORAL at 17:23

## 2021-03-03 RX ADMIN — HYDROMORPHONE HYDROCHLORIDE 0.5 MG: 1 INJECTION, SOLUTION INTRAMUSCULAR; INTRAVENOUS; SUBCUTANEOUS at 02:24

## 2021-03-03 RX ADMIN — OXYCODONE HYDROCHLORIDE 10 MG: 10 TABLET ORAL at 08:06

## 2021-03-03 RX ADMIN — QUETIAPINE FUMARATE 50 MG: 50 TABLET, EXTENDED RELEASE ORAL at 20:58

## 2021-03-03 RX ADMIN — DIVALPROEX SODIUM 500 MG: 500 TABLET, EXTENDED RELEASE ORAL at 08:06

## 2021-03-03 RX ADMIN — OXYCODONE HYDROCHLORIDE 10 MG: 10 TABLET ORAL at 12:16

## 2021-03-03 RX ADMIN — POTASSIUM & SODIUM PHOSPHATES POWDER PACK 280-160-250 MG 250 MG: 280-160-250 PACK at 21:24

## 2021-03-03 RX ADMIN — FAMOTIDINE 20 MG: 20 TABLET ORAL at 20:58

## 2021-03-03 RX ADMIN — HYDROMORPHONE HYDROCHLORIDE 0.5 MG: 1 INJECTION, SOLUTION INTRAMUSCULAR; INTRAVENOUS; SUBCUTANEOUS at 13:29

## 2021-03-03 RX ADMIN — FAMOTIDINE 20 MG: 20 TABLET ORAL at 08:06

## 2021-03-03 RX ADMIN — METHOCARBAMOL TABLETS 750 MG: 750 TABLET, COATED ORAL at 20:58

## 2021-03-03 RX ADMIN — HYDROMORPHONE HYDROCHLORIDE 0.5 MG: 1 INJECTION, SOLUTION INTRAMUSCULAR; INTRAVENOUS; SUBCUTANEOUS at 09:35

## 2021-03-03 RX ADMIN — METHOCARBAMOL TABLETS 750 MG: 750 TABLET, COATED ORAL at 08:06

## 2021-03-03 RX ADMIN — HYDROMORPHONE HYDROCHLORIDE 0.25 MG: 1 INJECTION, SOLUTION INTRAMUSCULAR; INTRAVENOUS; SUBCUTANEOUS at 23:50

## 2021-03-03 RX ADMIN — METHOCARBAMOL TABLETS 750 MG: 750 TABLET, COATED ORAL at 12:16

## 2021-03-03 RX ADMIN — ACETAMINOPHEN 650 MG: 325 TABLET, FILM COATED ORAL at 05:54

## 2021-03-03 RX ADMIN — HYDROMORPHONE HYDROCHLORIDE 0.5 MG: 1 INJECTION, SOLUTION INTRAMUSCULAR; INTRAVENOUS; SUBCUTANEOUS at 05:55

## 2021-03-03 RX ADMIN — ACETAMINOPHEN 650 MG: 325 TABLET, FILM COATED ORAL at 17:23

## 2021-03-03 RX ADMIN — OXYCODONE HYDROCHLORIDE 10 MG: 10 TABLET ORAL at 16:38

## 2021-03-03 RX ADMIN — OXYCODONE HYDROCHLORIDE 10 MG: 10 TABLET ORAL at 20:58

## 2021-03-03 RX ADMIN — ENOXAPARIN SODIUM 40 MG: 40 INJECTION, SOLUTION INTRAVENOUS; SUBCUTANEOUS at 08:06

## 2021-03-03 RX ADMIN — POTASSIUM PHOSPHATE, MONOBASIC AND POTASSIUM PHOSPHATE, DIBASIC 30 MMOL: 224; 236 INJECTION, SOLUTION, CONCENTRATE INTRAVENOUS at 11:01

## 2021-03-03 RX ADMIN — METHOCARBAMOL TABLETS 750 MG: 750 TABLET, COATED ORAL at 17:23

## 2021-03-03 RX ADMIN — ACETAMINOPHEN 650 MG: 325 TABLET, FILM COATED ORAL at 23:50

## 2021-03-03 ASSESSMENT — PAIN DESCRIPTION - PROGRESSION: CLINICAL_PROGRESSION: GRADUALLY WORSENING

## 2021-03-03 ASSESSMENT — PAIN DESCRIPTION - PAIN TYPE: TYPE: ACUTE PAIN

## 2021-03-03 ASSESSMENT — PAIN DESCRIPTION - ONSET: ONSET: GRADUAL

## 2021-03-03 ASSESSMENT — PAIN DESCRIPTION - LOCATION
LOCATION: ABDOMEN
LOCATION: ABDOMEN

## 2021-03-03 ASSESSMENT — PAIN SCALES - GENERAL
PAINLEVEL_OUTOF10: 7
PAINLEVEL_OUTOF10: 10
PAINLEVEL_OUTOF10: 5
PAINLEVEL_OUTOF10: 7
PAINLEVEL_OUTOF10: 4

## 2021-03-03 ASSESSMENT — PAIN DESCRIPTION - ORIENTATION
ORIENTATION: RIGHT;LEFT;MID;LOWER
ORIENTATION: RIGHT;LEFT

## 2021-03-03 ASSESSMENT — PAIN DESCRIPTION - DESCRIPTORS: DESCRIPTORS: SHARP;STABBING

## 2021-03-03 NOTE — CARE COORDINATION
Care Coordination-  The patient was admitted due to laparoscopic right hemicolectomy with stapled ileocolonic anastomosis. The patient was started on a clear liquid diet. The patient lives alone in a basement level apt with 5 steps to enter per pt eval ampac is 17/24 and he ambulated 85 feet sba and ot ampc is 19/24. Plan is home once he is medically stable.

## 2021-03-03 NOTE — PLAN OF CARE
Problem: Pain:  Goal: Pain level will decrease  Description: Pain level will decrease  3/3/2021 0913 by Asad Ivan RN  Outcome: Met This Shift

## 2021-03-03 NOTE — PROGRESS NOTES
General Surgery Progress Note:    CC: post op    S: doing well moderate pain.        Objective:  @/82   Pulse 73   Temp 97.9 °F (36.6 °C) (Oral)   Resp 18   Ht 5' 9\" (1.753 m)   Wt 225 lb (102.1 kg)   SpO2 94%   BMI 33.23 kg/m² @    Physical -     Gen: NAD  Resp: Breathing Comfortably, no resp distress clear b/l  CV: Reg Rate  Abd: soft appropriate post op tenderness wounds CDI   EXT NVI    Assessment/Plan: s/p lap r mitzy     IVF, sips clears, pain control, gum candy, ambulate     VTE Prophylaxis: scds, lovenox       Rena Kapoor MD FACS     10:16 AM

## 2021-03-03 NOTE — PROGRESS NOTES
OCCUPATIONAL THERAPY INITIAL EVALUATION      Date:3/3/2021  Patient Name: Tabby Bonilla  MRN: [id number]  : 1960  Room: [address] [place] [address]-A    Referring Provider: Naman Ruiz DO    Evaluating OT: Juan C Nevarez [place] #[id number]    AM-PAC Daily Activity Raw Score:     Recommended Adaptive Equipment/DME: To be further assessed      Diagnosis: s/p laproscopic colectomy      Procedures: 3/2 R hemicolectomy   Pertinent Medical History: COVID, HTN, PTSD, bipolar disorder, anxiety     Precautions:  Falls, abdominal incision       Home Living: Pt lives alone in a bottom level apt. With 5 step(s) to enter and 2 rail(s); Bathroom setup: tub shower   Equipment owned: none    Prior Level of Function:  Independent  with ADLs , Independent  with IADLs; using no AD for ambulation. Driving: yes  Occupation: works at [employer]       Pain Level: 4/10 abdomen   Cognition: A&O: 4/4;   Follows 2 step directions: good    Memory:  good    Sequencing:  good    Problem solving:  good    Judgement/safety:  fair      Functional Assessment:   Initial Eval Status  Date: 3/3/21 Treatment Status  Date: STG=LTG  Time frame: 5-7 days   Feeding Independent       Grooming Stand by Assist   Independent    UB Dressing Stand by Assist   Independent    LB Dressing Stand by Assist   Independent    Bathing Stand by Assist  Independent    Toileting Stand by Assist   Independent    Bed Mobility  Supine to sit: Stand by Assist   Sit to supine: Stand by Assist   Supine to sit:  Independent   Sit to supine: Independent    Functional Transfers Sit to stand: Stand by Assist   Stand to sit: Stand by Assist   Stand pivot: Stand by Assist   Independent    Functional Mobility SBA with no AD  Short but functional distance in room    indep   Balance Sitting:     Static:  indep    Dynamic:SBA  Standing: SBA  Sitting:     Static:  indep    Dynamic:indep  Standing: indep   Activity Tolerance fair  good   Visual/  Perceptual Glasses: yes          KAPIL ROM/Strength/  Fine motor Coordination Hand dominance: R    RUE: ROM WFL     Strength: grossly 4/5      Strength:  WFL     Coordination:   WFL    LUE: ROM  WFL     Strength: grossly 4/5      Strength:  WFL     Coordination: WFL       Hearing: WFL   Sensation:   No c/o numbness or tingling   Tone:  WFL   Edema: none noted    Comments: RN cleared pt for OT. Upon arrival patient in bed . Overall patient demonstrated slight  decreased  independence with  ADLs,  bed mobility,functional transfers, functional mobility d/t recent abdominal surgery Pt demonstrated good carry over with modified techniques  to protect incision for completion of ADLs, At end of session, patient in bed with call light and phone within reach, all lines and tubes intact. Pt would benefit from continued skilled OT to increase independence with  ADLs, functional mobility,  safety,  and quality of life. Treatment: OT treatment provided this date includes:  ? ADL-  Instruction/training on safety and adapted techniques for completion of ADLs:   ?  Mobility-  Instruction/training on safety and improved independence with bed mobility/functional transfers  and functional mobility. ?       Assessment of current deficits   Functional mobility [x]  ADLs [x] Strength [x]  Cognition []  Functional transfers  [x] IADLs [] Safety Awareness [x]  Endurance [x]  Fine Motor Coordination [] Balance [x] Vision/perception [] Sensation []   Gross Motor Coordination [] ROM [] Delirium []                  Motor Control []    Plan of Care: 1-3 days/wk for 1-2 weeks PRN     Instruction/training on adapted ADL techniques and AE recommendations to increase functional independence within precautions  Functional transfer/mobility training/DME recommendations for increased independence, safety, and fall prevention  Patient/Family education to increase follow through with safety techniques and functional independence

## 2021-03-03 NOTE — PLAN OF CARE
Problem: Pain:  Goal: Pain level will decrease  Description: Pain level will decrease  3/3/2021 0100 by Nae Moore RN  Outcome: Met This Shift  3/2/2021 1142 by Samm Gamino RN  Outcome: Met This Shift  Goal: Control of acute pain  Description: Control of acute pain  Outcome: Met This Shift

## 2021-03-04 LAB
ANION GAP SERPL CALCULATED.3IONS-SCNC: 7 MMOL/L (ref 7–16)
BASOPHILS ABSOLUTE: 0.03 E9/L (ref 0–0.2)
BASOPHILS RELATIVE PERCENT: 0.4 % (ref 0–2)
BUN BLDV-MCNC: 5 MG/DL (ref 6–20)
CALCIUM SERPL-MCNC: 8.9 MG/DL (ref 8.6–10.2)
CHLORIDE BLD-SCNC: 104 MMOL/L (ref 98–107)
CO2: 26 MMOL/L (ref 22–29)
CREAT SERPL-MCNC: 0.8 MG/DL (ref 0.7–1.2)
EOSINOPHILS ABSOLUTE: 0.18 E9/L (ref 0.05–0.5)
EOSINOPHILS RELATIVE PERCENT: 2.7 % (ref 0–6)
GFR AFRICAN AMERICAN: >60
GFR NON-AFRICAN AMERICAN: >60 ML/MIN/1.73
GLUCOSE BLD-MCNC: 119 MG/DL (ref 74–99)
HCT VFR BLD CALC: 41 % (ref 37–54)
HEMOGLOBIN: 14.2 G/DL (ref 12.5–16.5)
IMMATURE GRANULOCYTES #: 0.03 E9/L
IMMATURE GRANULOCYTES %: 0.4 % (ref 0–5)
LYMPHOCYTES ABSOLUTE: 1.36 E9/L (ref 1.5–4)
LYMPHOCYTES RELATIVE PERCENT: 20.1 % (ref 20–42)
MAGNESIUM: 1.8 MG/DL (ref 1.6–2.6)
MCH RBC QN AUTO: 33 PG (ref 26–35)
MCHC RBC AUTO-ENTMCNC: 34.6 % (ref 32–34.5)
MCV RBC AUTO: 95.3 FL (ref 80–99.9)
MONOCYTES ABSOLUTE: 0.75 E9/L (ref 0.1–0.95)
MONOCYTES RELATIVE PERCENT: 11.1 % (ref 2–12)
NEUTROPHILS ABSOLUTE: 4.41 E9/L (ref 1.8–7.3)
NEUTROPHILS RELATIVE PERCENT: 65.3 % (ref 43–80)
PDW BLD-RTO: 13.6 FL (ref 11.5–15)
PHOSPHORUS: 2.4 MG/DL (ref 2.5–4.5)
PLATELET # BLD: 171 E9/L (ref 130–450)
PMV BLD AUTO: 10 FL (ref 7–12)
POTASSIUM SERPL-SCNC: 3.9 MMOL/L (ref 3.5–5)
RBC # BLD: 4.3 E12/L (ref 3.8–5.8)
SODIUM BLD-SCNC: 137 MMOL/L (ref 132–146)
WBC # BLD: 6.8 E9/L (ref 4.5–11.5)

## 2021-03-04 PROCEDURE — 6360000002 HC RX W HCPCS: Performed by: STUDENT IN AN ORGANIZED HEALTH CARE EDUCATION/TRAINING PROGRAM

## 2021-03-04 PROCEDURE — 80048 BASIC METABOLIC PNL TOTAL CA: CPT

## 2021-03-04 PROCEDURE — 6370000000 HC RX 637 (ALT 250 FOR IP): Performed by: STUDENT IN AN ORGANIZED HEALTH CARE EDUCATION/TRAINING PROGRAM

## 2021-03-04 PROCEDURE — 83735 ASSAY OF MAGNESIUM: CPT

## 2021-03-04 PROCEDURE — 2500000003 HC RX 250 WO HCPCS: Performed by: STUDENT IN AN ORGANIZED HEALTH CARE EDUCATION/TRAINING PROGRAM

## 2021-03-04 PROCEDURE — 36415 COLL VENOUS BLD VENIPUNCTURE: CPT

## 2021-03-04 PROCEDURE — 99024 POSTOP FOLLOW-UP VISIT: CPT | Performed by: SURGERY

## 2021-03-04 PROCEDURE — 84100 ASSAY OF PHOSPHORUS: CPT

## 2021-03-04 PROCEDURE — 2580000003 HC RX 258: Performed by: STUDENT IN AN ORGANIZED HEALTH CARE EDUCATION/TRAINING PROGRAM

## 2021-03-04 PROCEDURE — 85025 COMPLETE CBC W/AUTO DIFF WBC: CPT

## 2021-03-04 PROCEDURE — 1200000000 HC SEMI PRIVATE

## 2021-03-04 RX ADMIN — DIVALPROEX SODIUM 500 MG: 500 TABLET, EXTENDED RELEASE ORAL at 17:07

## 2021-03-04 RX ADMIN — QUETIAPINE FUMARATE 50 MG: 50 TABLET, EXTENDED RELEASE ORAL at 20:48

## 2021-03-04 RX ADMIN — SODIUM CHLORIDE, PRESERVATIVE FREE 10 ML: 5 INJECTION INTRAVENOUS at 09:06

## 2021-03-04 RX ADMIN — HYDROMORPHONE HYDROCHLORIDE 0.5 MG: 1 INJECTION, SOLUTION INTRAMUSCULAR; INTRAVENOUS; SUBCUTANEOUS at 03:51

## 2021-03-04 RX ADMIN — POTASSIUM & SODIUM PHOSPHATES POWDER PACK 280-160-250 MG 250 MG: 280-160-250 PACK at 12:22

## 2021-03-04 RX ADMIN — POTASSIUM & SODIUM PHOSPHATES POWDER PACK 280-160-250 MG 250 MG: 280-160-250 PACK at 09:05

## 2021-03-04 RX ADMIN — ACETAMINOPHEN 650 MG: 325 TABLET, FILM COATED ORAL at 06:01

## 2021-03-04 RX ADMIN — DEXTROSE MONOHYDRATE, SODIUM CHLORIDE, AND POTASSIUM CHLORIDE: 50; 4.5; 1.49 INJECTION, SOLUTION INTRAVENOUS at 09:05

## 2021-03-04 RX ADMIN — OXYCODONE HYDROCHLORIDE 10 MG: 10 TABLET ORAL at 12:22

## 2021-03-04 RX ADMIN — ENOXAPARIN SODIUM 40 MG: 40 INJECTION, SOLUTION INTRAVENOUS; SUBCUTANEOUS at 09:05

## 2021-03-04 RX ADMIN — FAMOTIDINE 20 MG: 20 TABLET ORAL at 09:05

## 2021-03-04 RX ADMIN — ONDANSETRON 4 MG: 2 INJECTION INTRAMUSCULAR; INTRAVENOUS at 12:33

## 2021-03-04 RX ADMIN — OXYCODONE HYDROCHLORIDE 10 MG: 10 TABLET ORAL at 01:14

## 2021-03-04 RX ADMIN — METHOCARBAMOL TABLETS 750 MG: 750 TABLET, COATED ORAL at 20:48

## 2021-03-04 RX ADMIN — ACETAMINOPHEN 650 MG: 325 TABLET, FILM COATED ORAL at 12:22

## 2021-03-04 RX ADMIN — DEXTROSE MONOHYDRATE, SODIUM CHLORIDE, AND POTASSIUM CHLORIDE: 50; 4.5; 1.49 INJECTION, SOLUTION INTRAVENOUS at 18:24

## 2021-03-04 RX ADMIN — METHOCARBAMOL TABLETS 750 MG: 750 TABLET, COATED ORAL at 09:05

## 2021-03-04 RX ADMIN — ACETAMINOPHEN 650 MG: 325 TABLET, FILM COATED ORAL at 17:06

## 2021-03-04 RX ADMIN — METHOCARBAMOL TABLETS 750 MG: 750 TABLET, COATED ORAL at 12:22

## 2021-03-04 RX ADMIN — OXYCODONE HYDROCHLORIDE 10 MG: 10 TABLET ORAL at 06:02

## 2021-03-04 RX ADMIN — FAMOTIDINE 20 MG: 20 TABLET ORAL at 20:48

## 2021-03-04 RX ADMIN — HYDROMORPHONE HYDROCHLORIDE 0.5 MG: 1 INJECTION, SOLUTION INTRAMUSCULAR; INTRAVENOUS; SUBCUTANEOUS at 09:06

## 2021-03-04 RX ADMIN — METHOCARBAMOL TABLETS 750 MG: 750 TABLET, COATED ORAL at 17:08

## 2021-03-04 ASSESSMENT — PAIN DESCRIPTION - LOCATION
LOCATION: ABDOMEN

## 2021-03-04 ASSESSMENT — PAIN SCALES - GENERAL
PAINLEVEL_OUTOF10: 7
PAINLEVEL_OUTOF10: 5
PAINLEVEL_OUTOF10: 7
PAINLEVEL_OUTOF10: 7

## 2021-03-04 ASSESSMENT — PAIN DESCRIPTION - ORIENTATION
ORIENTATION: RIGHT;LEFT
ORIENTATION: RIGHT;LEFT

## 2021-03-04 ASSESSMENT — PAIN DESCRIPTION - FREQUENCY
FREQUENCY: CONTINUOUS

## 2021-03-04 ASSESSMENT — PAIN - FUNCTIONAL ASSESSMENT: PAIN_FUNCTIONAL_ASSESSMENT: PREVENTS OR INTERFERES SOME ACTIVE ACTIVITIES AND ADLS

## 2021-03-04 ASSESSMENT — PAIN DESCRIPTION - DESCRIPTORS
DESCRIPTORS: ACHING;CONSTANT;DISCOMFORT

## 2021-03-04 ASSESSMENT — PAIN DESCRIPTION - ONSET
ONSET: ON-GOING
ONSET: ON-GOING

## 2021-03-04 ASSESSMENT — PAIN DESCRIPTION - PAIN TYPE
TYPE: ACUTE PAIN

## 2021-03-04 NOTE — PROGRESS NOTES
General Surgery Progress Note:    CC: post op    S: doing well moderate pain.  No flatus just some burping, no n/v       Objective:  @BP (!) 155/90   Pulse 89   Temp 97.8 °F (36.6 °C) (Temporal)   Resp 18   Ht 5' 9\" (1.753 m)   Wt 225 lb (102.1 kg)   SpO2 94%   BMI 33.23 kg/m² @    Physical -     Gen: NAD  Resp: Breathing Comfortably, no resp distress clear b/l  CV: Reg Rate  Abd: soft appropriate post op tenderness wounds CDI   EXT NVI    Assessment/Plan: s/p lap r mitzy     IVF, sips clears, pain control, gum candy, ambulate     VTE Prophylaxis: scds, pratima Disla MD FACS     9:29 AM

## 2021-03-05 LAB
ANION GAP SERPL CALCULATED.3IONS-SCNC: 7 MMOL/L (ref 7–16)
BASOPHILS ABSOLUTE: 0.02 E9/L (ref 0–0.2)
BASOPHILS RELATIVE PERCENT: 0.4 % (ref 0–2)
BUN BLDV-MCNC: 4 MG/DL (ref 6–20)
CALCIUM SERPL-MCNC: 9.5 MG/DL (ref 8.6–10.2)
CHLORIDE BLD-SCNC: 102 MMOL/L (ref 98–107)
CO2: 29 MMOL/L (ref 22–29)
CREAT SERPL-MCNC: 0.9 MG/DL (ref 0.7–1.2)
EOSINOPHILS ABSOLUTE: 0.26 E9/L (ref 0.05–0.5)
EOSINOPHILS RELATIVE PERCENT: 4.6 % (ref 0–6)
GFR AFRICAN AMERICAN: >60
GFR NON-AFRICAN AMERICAN: >60 ML/MIN/1.73
GLUCOSE BLD-MCNC: 122 MG/DL (ref 74–99)
HCT VFR BLD CALC: 40.9 % (ref 37–54)
HEMOGLOBIN: 13.6 G/DL (ref 12.5–16.5)
IMMATURE GRANULOCYTES #: 0.02 E9/L
IMMATURE GRANULOCYTES %: 0.4 % (ref 0–5)
LYMPHOCYTES ABSOLUTE: 0.88 E9/L (ref 1.5–4)
LYMPHOCYTES RELATIVE PERCENT: 15.4 % (ref 20–42)
MAGNESIUM: 1.9 MG/DL (ref 1.6–2.6)
MCH RBC QN AUTO: 32.4 PG (ref 26–35)
MCHC RBC AUTO-ENTMCNC: 33.3 % (ref 32–34.5)
MCV RBC AUTO: 97.4 FL (ref 80–99.9)
MONOCYTES ABSOLUTE: 0.62 E9/L (ref 0.1–0.95)
MONOCYTES RELATIVE PERCENT: 10.9 % (ref 2–12)
NEUTROPHILS ABSOLUTE: 3.91 E9/L (ref 1.8–7.3)
NEUTROPHILS RELATIVE PERCENT: 68.3 % (ref 43–80)
PDW BLD-RTO: 13.4 FL (ref 11.5–15)
PHOSPHORUS: 3 MG/DL (ref 2.5–4.5)
PLATELET # BLD: 172 E9/L (ref 130–450)
PMV BLD AUTO: 9.9 FL (ref 7–12)
POTASSIUM SERPL-SCNC: 4.3 MMOL/L (ref 3.5–5)
POTASSIUM SERPL-SCNC: 5.5 MMOL/L (ref 3.5–5)
RBC # BLD: 4.2 E12/L (ref 3.8–5.8)
SODIUM BLD-SCNC: 138 MMOL/L (ref 132–146)
WBC # BLD: 5.7 E9/L (ref 4.5–11.5)

## 2021-03-05 PROCEDURE — 83735 ASSAY OF MAGNESIUM: CPT

## 2021-03-05 PROCEDURE — 36415 COLL VENOUS BLD VENIPUNCTURE: CPT

## 2021-03-05 PROCEDURE — 6360000002 HC RX W HCPCS: Performed by: STUDENT IN AN ORGANIZED HEALTH CARE EDUCATION/TRAINING PROGRAM

## 2021-03-05 PROCEDURE — 6370000000 HC RX 637 (ALT 250 FOR IP): Performed by: STUDENT IN AN ORGANIZED HEALTH CARE EDUCATION/TRAINING PROGRAM

## 2021-03-05 PROCEDURE — 2500000003 HC RX 250 WO HCPCS: Performed by: STUDENT IN AN ORGANIZED HEALTH CARE EDUCATION/TRAINING PROGRAM

## 2021-03-05 PROCEDURE — 97535 SELF CARE MNGMENT TRAINING: CPT

## 2021-03-05 PROCEDURE — 80048 BASIC METABOLIC PNL TOTAL CA: CPT

## 2021-03-05 PROCEDURE — 84100 ASSAY OF PHOSPHORUS: CPT

## 2021-03-05 PROCEDURE — 2580000003 HC RX 258: Performed by: STUDENT IN AN ORGANIZED HEALTH CARE EDUCATION/TRAINING PROGRAM

## 2021-03-05 PROCEDURE — 99024 POSTOP FOLLOW-UP VISIT: CPT | Performed by: SURGERY

## 2021-03-05 PROCEDURE — 1200000000 HC SEMI PRIVATE

## 2021-03-05 PROCEDURE — 97530 THERAPEUTIC ACTIVITIES: CPT

## 2021-03-05 PROCEDURE — 85025 COMPLETE CBC W/AUTO DIFF WBC: CPT

## 2021-03-05 PROCEDURE — 84132 ASSAY OF SERUM POTASSIUM: CPT

## 2021-03-05 RX ORDER — ONDANSETRON 4 MG/1
4 TABLET, FILM COATED ORAL DAILY PRN
Qty: 12 TABLET | Refills: 0 | Status: SHIPPED | OUTPATIENT
Start: 2021-03-05 | End: 2021-03-07 | Stop reason: SDUPTHER

## 2021-03-05 RX ORDER — ACETAMINOPHEN 500 MG
1000 TABLET ORAL 3 TIMES DAILY
Qty: 60 TABLET | Refills: 0 | Status: SHIPPED | OUTPATIENT
Start: 2021-03-05 | End: 2021-03-07 | Stop reason: SDUPTHER

## 2021-03-05 RX ORDER — METHOCARBAMOL 750 MG/1
750 TABLET, FILM COATED ORAL 4 TIMES DAILY
Qty: 40 TABLET | Refills: 0 | Status: SHIPPED | OUTPATIENT
Start: 2021-03-06 | End: 2021-03-07

## 2021-03-05 RX ORDER — DOCUSATE SODIUM 100 MG/1
100 CAPSULE, LIQUID FILLED ORAL 2 TIMES DAILY
Qty: 50 CAPSULE | Refills: 0 | Status: SHIPPED | OUTPATIENT
Start: 2021-03-05 | End: 2021-03-07 | Stop reason: SDUPTHER

## 2021-03-05 RX ORDER — IBUPROFEN 600 MG/1
600 TABLET ORAL 4 TIMES DAILY PRN
Qty: 40 TABLET | Refills: 0 | Status: SHIPPED | OUTPATIENT
Start: 2021-03-05 | End: 2021-03-07 | Stop reason: SDUPTHER

## 2021-03-05 RX ORDER — OXYCODONE HYDROCHLORIDE 5 MG/1
5 TABLET ORAL EVERY 6 HOURS PRN
Qty: 28 TABLET | Refills: 0 | Status: SHIPPED | OUTPATIENT
Start: 2021-03-05 | End: 2021-03-07 | Stop reason: SDUPTHER

## 2021-03-05 RX ADMIN — SODIUM CHLORIDE, PRESERVATIVE FREE 10 ML: 5 INJECTION INTRAVENOUS at 21:03

## 2021-03-05 RX ADMIN — ONDANSETRON 4 MG: 2 INJECTION INTRAMUSCULAR; INTRAVENOUS at 07:44

## 2021-03-05 RX ADMIN — OXYCODONE HYDROCHLORIDE 10 MG: 10 TABLET ORAL at 21:03

## 2021-03-05 RX ADMIN — Medication 500 MG: at 18:00

## 2021-03-05 RX ADMIN — ONDANSETRON 4 MG: 2 INJECTION INTRAMUSCULAR; INTRAVENOUS at 21:03

## 2021-03-05 RX ADMIN — ENOXAPARIN SODIUM 40 MG: 40 INJECTION, SOLUTION INTRAVENOUS; SUBCUTANEOUS at 07:44

## 2021-03-05 RX ADMIN — QUETIAPINE FUMARATE 50 MG: 50 TABLET, EXTENDED RELEASE ORAL at 21:03

## 2021-03-05 RX ADMIN — DIVALPROEX SODIUM 500 MG: 500 TABLET, EXTENDED RELEASE ORAL at 07:44

## 2021-03-05 RX ADMIN — DIVALPROEX SODIUM 500 MG: 500 TABLET, EXTENDED RELEASE ORAL at 18:00

## 2021-03-05 RX ADMIN — DEXTROSE MONOHYDRATE, SODIUM CHLORIDE, AND POTASSIUM CHLORIDE: 50; 4.5; 1.49 INJECTION, SOLUTION INTRAVENOUS at 04:44

## 2021-03-05 RX ADMIN — OXYCODONE HYDROCHLORIDE 10 MG: 10 TABLET ORAL at 16:41

## 2021-03-05 RX ADMIN — OXYCODONE HYDROCHLORIDE 10 MG: 10 TABLET ORAL at 07:44

## 2021-03-05 RX ADMIN — METHOCARBAMOL TABLETS 750 MG: 750 TABLET, COATED ORAL at 21:03

## 2021-03-05 RX ADMIN — OXYCODONE HYDROCHLORIDE 10 MG: 10 TABLET ORAL at 12:08

## 2021-03-05 RX ADMIN — ACETAMINOPHEN 650 MG: 325 TABLET, FILM COATED ORAL at 06:00

## 2021-03-05 RX ADMIN — ACETAMINOPHEN 650 MG: 325 TABLET, FILM COATED ORAL at 00:25

## 2021-03-05 RX ADMIN — ACETAMINOPHEN 650 MG: 325 TABLET, FILM COATED ORAL at 18:00

## 2021-03-05 RX ADMIN — METHOCARBAMOL TABLETS 750 MG: 750 TABLET, COATED ORAL at 16:41

## 2021-03-05 ASSESSMENT — PAIN DESCRIPTION - PROGRESSION
CLINICAL_PROGRESSION: NOT CHANGED
CLINICAL_PROGRESSION: NOT CHANGED

## 2021-03-05 ASSESSMENT — PAIN DESCRIPTION - ORIENTATION: ORIENTATION: RIGHT;LEFT

## 2021-03-05 ASSESSMENT — PAIN SCALES - GENERAL
PAINLEVEL_OUTOF10: 5
PAINLEVEL_OUTOF10: 4
PAINLEVEL_OUTOF10: 7
PAINLEVEL_OUTOF10: 4
PAINLEVEL_OUTOF10: 6

## 2021-03-05 ASSESSMENT — PAIN - FUNCTIONAL ASSESSMENT
PAIN_FUNCTIONAL_ASSESSMENT: ACTIVITIES ARE NOT PREVENTED
PAIN_FUNCTIONAL_ASSESSMENT: ACTIVITIES ARE NOT PREVENTED

## 2021-03-05 ASSESSMENT — PAIN DESCRIPTION - FREQUENCY: FREQUENCY: CONTINUOUS

## 2021-03-05 ASSESSMENT — PAIN DESCRIPTION - PAIN TYPE: TYPE: ACUTE PAIN

## 2021-03-05 ASSESSMENT — PAIN DESCRIPTION - DESCRIPTORS: DESCRIPTORS: ACHING;CONSTANT;DISCOMFORT

## 2021-03-05 ASSESSMENT — PAIN DESCRIPTION - LOCATION
LOCATION: ABDOMEN
LOCATION: ABDOMEN

## 2021-03-05 NOTE — PROGRESS NOTES
General Surgery Progress Note:    CC: post op    S: doing ok had some nausea, and vomiting, yesterday but better today no flatus.         Objective:  @BP (!) 143/84   Pulse 85   Temp 98.9 °F (37.2 °C)   Resp 17   Ht 5' 9\" (1.753 m)   Wt 225 lb (102.1 kg)   SpO2 92%   BMI 33.23 kg/m² @    Physical -     Gen: NAD  Resp: Breathing Comfortably, no resp distress clear b/l  CV: Reg Rate  Abd: soft appropriate post op tenderness wounds CDI   EXT NVI    Assessment/Plan: s/p lap r mitzy     IVF, sips clears, pain control, gum candy, ambulate   Recheck K.    VTE Prophylaxis: scds, pratima Denson MD FACS     9:39 AM

## 2021-03-05 NOTE — PLAN OF CARE
Problem: Musculor/Skeletal Functional Status  Goal: Absence of falls  Outcome: Met This Shift     Problem: Falls - Risk of:  Goal: Will remain free from falls  Description: Will remain free from falls  Outcome: Met This Shift  Goal: Absence of physical injury  Description: Absence of physical injury  Outcome: Met This Shift

## 2021-03-05 NOTE — PROGRESS NOTES
Occupational Therapy  OT BEDSIDE TREATMENT NOTE      Date:3/5/2021  Patient Name: Jonathan Schulz  MRN: [id number]  : 1960  Room: 16/[facility]       Per OT Eval:      Referring Provider: Edwina Zambrano DO     Evaluating OT: Brian Gutierrez OTR/L #[id number]     AM-PAC Daily Activity Raw Score: 19/24     Recommended Adaptive Equipment/DME: To be further assessed       Diagnosis: s/p laproscopic colectomy      Procedures: 3 R hemicolectomy   Pertinent Medical History: COVID, HTN, PTSD, bipolar disorder, anxiety     Precautions:  Falls, abdominal       Home Living: Pt lives alone in a bottom level apt. With 5 step(s) to enter and 2 rail(s); Bathroom setup: tub shower   Equipment owned: none     Prior Level of Function:  Independent  with ADLs , Independent  with IADLs; using no AD for ambulation. Driving: yes  Occupation: works at [employer]      Pain Level: Pt c/o increased abdominal pain at rest (unable to quantify); decreased w/ activity to 4/10 (prompts provided to rate). Reinforced abdominal precautions    Cognition: A&O: 4/4;   Follows 2 step directions: good               Memory:  good               Sequencing:  good               Problem solving:  good               Judgement/safety:  fair                 Functional Assessment:    Initial Eval Status  Date: 3/3/21 Treatment Status  Date:3/5/21 STG=LTG  Time frame: 5-7 days   Feeding Independent  Independent      Grooming Stand by Assist   SBA  Standing at sink Independent    UB Dressing Stand by Assist   Supervision  Donned/doffed gown Independent    LB Dressing Stand by Assist   SBA  B socks Independent    Bathing Stand by Assist  SBA  Simulated Independent    Toileting Stand by Assist   SBA  Including bowel management Independent    Bed Mobility  Supine to sit: Stand by Assist   Sit to supine: Stand by Assist  Rolling: Supervision  Supine to sit: SBA  Sit to supine: SBA Supine to sit:  Independent   Sit to supine: Independent Functional Transfers Sit to stand: Stand by Assist   Stand to sit: Stand by Assist   Stand pivot: Stand by Assist  SBA  Various surfaces  Independent    Functional Mobility SBA with no AD  Short but functional distance in room     SBA w/o AD  In room and bathrm  Household distances (in hallway) indep   Balance Sitting:     Static:  indep    Dynamic:SBA  Standing: SBA Sitting: Supervision  Standing: SBA, no AD  Sitting:     Static:  indep    Dynamic:indep  Standing: indep   Activity Tolerance fair Fair  good   Visual/  Perceptual Glasses: yes             BUE  ROM/Strength/  Fine motor Coordination Hand dominance: R     RUE: ROM WFL     Strength: grossly 4/5      Strength:  WFL     Coordination:   WFL     LUE: ROM  WFL     Strength: grossly 4/5      Strength:  WFL     Coordination: WFL              Comments: Upon arrival pt lying in bed. Pt agreeable to OT session this date. Facilitation of bed mobility (education/cues for logroll technique/adbominal precautions), unsupported sitting balance (addressing posture, weight shifting and dynamic reaching to prep for ADL's), functional transfers (various surfaces-EOB, commode w/ education/cues for safety/hand placement), standing tolerance tasks (addressing posture, balance and activity tolerance while incorporating light functional reaching impacting ADLs) and functional ambulation tasks (to/from bathroom and in household distances w/ education/skilled cuing on posture, body mechanics, energy conservation techniques and safety). Therapist facilitated self-care retraining: UB/LB self-care tasks (gown, socks), toileting task (including hygiene) and standing grooming tasks while educating/cuing pt on modified techniques while protecting abdomen, posture, safety and energy conservation techniques. Skilled monitoring of HR, O2 sats and pts response to treatment. At end of session pt lying in bed (per pt request) all lines and tubes intact, call light within reach. · Pt has made good progress towards set goals. · Continue with current plan of care to increase functional independence.     Treatment Time In: 08:07           Treatment Time Out: 08:31             Treatment Charges: Mins Units   Ther Ex  [id number]     Manual Therapy Silvio Holguin [phone] [id number] [phone]   ADL/Home Mgt [id number] 15 1   Neuro Re-ed [id number]     Group Therapy      Orthotic manage/training  [id number]     Non-Billable Time     Total Timed Treatment 24 45 Barb Rivas, OTR/L #[id number]

## 2021-03-05 NOTE — PLAN OF CARE
Problem: Pain:  Goal: Pain level will decrease  Description: Pain level will decrease  Outcome: Met This Shift  Goal: Control of acute pain  Description: Control of acute pain  Outcome: Met This Shift     Problem: Musculor/Skeletal Functional Status  Goal: Absence of falls  3/5/2021 0452 by Nuno Osborn  Outcome: Met This Shift     Problem: Falls - Risk of:  Goal: Will remain free from falls  Description: Will remain free from falls  3/5/2021 0452 by Nuno Osborn  Outcome: Met This Shift     Problem: Falls - Risk of:  Goal: Absence of physical injury  Description: Absence of physical injury  3/5/2021 0452 by Nuno Osborn  Outcome: Met This Shift

## 2021-03-05 NOTE — PLAN OF CARE
Problem: Musculor/Skeletal Functional Status  Goal: Absence of falls  3/4/2021 2003 by Suzan Adams RN  Outcome: Met This Shift     Problem: Falls - Risk of:  Goal: Will remain free from falls  Description: Will remain free from falls  3/4/2021 2003 by Suzan Adams RN  Outcome: Met This Shift     Problem: Falls - Risk of:  Goal: Absence of physical injury  Description: Absence of physical injury  3/4/2021 2003 by Suzan Adams RN  Outcome: Met This Shift

## 2021-03-05 NOTE — CARE COORDINATION
Care Coordination-  The patient is doing well post op the patient can have sips and chips of clears, gum and candy . Ambulate OT eval 19/24. The plan remains home once his bowel function returns he remains on ivf at 100 cc hr  .  I will follow

## 2021-03-06 LAB
ANION GAP SERPL CALCULATED.3IONS-SCNC: 8 MMOL/L (ref 7–16)
BUN BLDV-MCNC: 4 MG/DL (ref 6–20)
CALCIUM SERPL-MCNC: 8.6 MG/DL (ref 8.6–10.2)
CHLORIDE BLD-SCNC: 104 MMOL/L (ref 98–107)
CO2: 27 MMOL/L (ref 22–29)
CREAT SERPL-MCNC: 0.9 MG/DL (ref 0.7–1.2)
GFR AFRICAN AMERICAN: >60
GFR NON-AFRICAN AMERICAN: >60 ML/MIN/1.73
GLUCOSE BLD-MCNC: 100 MG/DL (ref 74–99)
POTASSIUM SERPL-SCNC: 4.5 MMOL/L (ref 3.5–5)
SODIUM BLD-SCNC: 139 MMOL/L (ref 132–146)

## 2021-03-06 PROCEDURE — 6370000000 HC RX 637 (ALT 250 FOR IP): Performed by: STUDENT IN AN ORGANIZED HEALTH CARE EDUCATION/TRAINING PROGRAM

## 2021-03-06 PROCEDURE — 6360000002 HC RX W HCPCS: Performed by: STUDENT IN AN ORGANIZED HEALTH CARE EDUCATION/TRAINING PROGRAM

## 2021-03-06 PROCEDURE — 80048 BASIC METABOLIC PNL TOTAL CA: CPT

## 2021-03-06 PROCEDURE — 1200000000 HC SEMI PRIVATE

## 2021-03-06 PROCEDURE — 36415 COLL VENOUS BLD VENIPUNCTURE: CPT

## 2021-03-06 PROCEDURE — 2580000003 HC RX 258: Performed by: STUDENT IN AN ORGANIZED HEALTH CARE EDUCATION/TRAINING PROGRAM

## 2021-03-06 PROCEDURE — 99024 POSTOP FOLLOW-UP VISIT: CPT | Performed by: SURGERY

## 2021-03-06 RX ADMIN — Medication 500 MG: at 08:56

## 2021-03-06 RX ADMIN — METHOCARBAMOL TABLETS 750 MG: 750 TABLET, COATED ORAL at 20:28

## 2021-03-06 RX ADMIN — ENOXAPARIN SODIUM 40 MG: 40 INJECTION, SOLUTION INTRAVENOUS; SUBCUTANEOUS at 08:56

## 2021-03-06 RX ADMIN — METHOCARBAMOL TABLETS 750 MG: 750 TABLET, COATED ORAL at 13:15

## 2021-03-06 RX ADMIN — METHOCARBAMOL TABLETS 750 MG: 750 TABLET, COATED ORAL at 16:56

## 2021-03-06 RX ADMIN — FAMOTIDINE 20 MG: 20 TABLET ORAL at 20:28

## 2021-03-06 RX ADMIN — SODIUM CHLORIDE, PRESERVATIVE FREE 10 ML: 5 INJECTION INTRAVENOUS at 20:28

## 2021-03-06 RX ADMIN — OXYCODONE HYDROCHLORIDE 10 MG: 10 TABLET ORAL at 00:53

## 2021-03-06 RX ADMIN — QUETIAPINE FUMARATE 50 MG: 50 TABLET, EXTENDED RELEASE ORAL at 23:12

## 2021-03-06 RX ADMIN — FAMOTIDINE 20 MG: 20 TABLET ORAL at 08:57

## 2021-03-06 RX ADMIN — ACETAMINOPHEN 650 MG: 325 TABLET, FILM COATED ORAL at 16:57

## 2021-03-06 RX ADMIN — DIVALPROEX SODIUM 500 MG: 500 TABLET, EXTENDED RELEASE ORAL at 16:57

## 2021-03-06 RX ADMIN — ACETAMINOPHEN 650 MG: 325 TABLET, FILM COATED ORAL at 05:08

## 2021-03-06 RX ADMIN — METHOCARBAMOL TABLETS 750 MG: 750 TABLET, COATED ORAL at 08:56

## 2021-03-06 RX ADMIN — SODIUM CHLORIDE, PRESERVATIVE FREE 10 ML: 5 INJECTION INTRAVENOUS at 08:56

## 2021-03-06 ASSESSMENT — PAIN SCALES - GENERAL
PAINLEVEL_OUTOF10: 0
PAINLEVEL_OUTOF10: 5
PAINLEVEL_OUTOF10: 3

## 2021-03-06 NOTE — PLAN OF CARE
Problem: Pain:  Goal: Pain level will decrease  Description: Pain level will decrease  3/6/2021 1053 by Carmenza Craig RN  Outcome: Met This Shift  3/6/2021 0349 by Eric Hammonds RN  Outcome: Met This Shift  Goal: Control of acute pain  Description: Control of acute pain  3/6/2021 1053 by Carmenza Craig RN  Outcome: Met This Shift  3/6/2021 0349 by Eric Hammonds RN  Outcome: Met This Shift  Goal: Control of chronic pain  Description: Control of chronic pain  Outcome: Met This Shift     Problem: Musculor/Skeletal Functional Status  Goal: Highest potential functional level  3/6/2021 1053 by Carmenza Craig RN  Outcome: Met This Shift  3/6/2021 0349 by Eric Hammonds RN  Outcome: Met This Shift  Goal: Absence of falls  3/6/2021 1053 by Carmenza Craig RN  Outcome: Met This Shift  3/6/2021 0349 by Eric Hammonds RN  Outcome: Met This Shift     Problem: Falls - Risk of:  Goal: Will remain free from falls  Description: Will remain free from falls  Outcome: Met This Shift  Goal: Absence of physical injury  Description: Absence of physical injury  Outcome: Met This Shift

## 2021-03-06 NOTE — PROGRESS NOTES
General Surgery Progress Note:    CC: post op    S: + flatus       Objective:  @BP (!) 152/103   Pulse 82   Temp 98.6 °F (37 °C) (Temporal)   Resp 18   Ht 5' 9\" (1.753 m)   Wt 225 lb (102.1 kg)   SpO2 97%   BMI 33.23 kg/m² @    Physical -     Gen: NAD  Resp: Breathing Comfortably, no resp distress clear b/l  CV: Reg Rate  Abd: soft appropriate post op tenderness wounds CDI   EXT NVI    Assessment/Plan: s/p lap r mitzy     Advance diet,   Pain control SMI deep breathing  VTE Prophylaxis: scds, lovenox       Jewell Washington MD FACS     7:56 AM

## 2021-03-06 NOTE — PLAN OF CARE
Problem: Pain:  Goal: Pain level will decrease  Description: Pain level will decrease  Outcome: Met This Shift  Goal: Control of acute pain  Description: Control of acute pain  Outcome: Met This Shift     Problem: Musculor/Skeletal Functional Status  Goal: Highest potential functional level  Outcome: Met This Shift  Goal: Absence of falls  Outcome: Met This Shift

## 2021-03-07 VITALS
BODY MASS INDEX: 33.68 KG/M2 | TEMPERATURE: 97.6 F | WEIGHT: 227.4 LBS | SYSTOLIC BLOOD PRESSURE: 140 MMHG | DIASTOLIC BLOOD PRESSURE: 85 MMHG | HEIGHT: 69 IN | RESPIRATION RATE: 18 BRPM | HEART RATE: 75 BPM | OXYGEN SATURATION: 92 %

## 2021-03-07 PROCEDURE — 6370000000 HC RX 637 (ALT 250 FOR IP): Performed by: STUDENT IN AN ORGANIZED HEALTH CARE EDUCATION/TRAINING PROGRAM

## 2021-03-07 PROCEDURE — 99024 POSTOP FOLLOW-UP VISIT: CPT | Performed by: SURGERY

## 2021-03-07 PROCEDURE — 2580000003 HC RX 258: Performed by: STUDENT IN AN ORGANIZED HEALTH CARE EDUCATION/TRAINING PROGRAM

## 2021-03-07 RX ORDER — ONDANSETRON 4 MG/1
4 TABLET, FILM COATED ORAL DAILY PRN
Qty: 12 TABLET | Refills: 0 | Status: SHIPPED | OUTPATIENT
Start: 2021-03-07 | End: 2021-03-18 | Stop reason: ALTCHOICE

## 2021-03-07 RX ORDER — DOCUSATE SODIUM 100 MG/1
100 CAPSULE, LIQUID FILLED ORAL 2 TIMES DAILY
Qty: 50 CAPSULE | Refills: 0 | Status: SHIPPED | OUTPATIENT
Start: 2021-03-07

## 2021-03-07 RX ORDER — IBUPROFEN 600 MG/1
600 TABLET ORAL 4 TIMES DAILY PRN
Qty: 40 TABLET | Refills: 0 | Status: SHIPPED | OUTPATIENT
Start: 2021-03-07

## 2021-03-07 RX ORDER — OXYCODONE HYDROCHLORIDE 5 MG/1
5 TABLET ORAL EVERY 6 HOURS PRN
Qty: 20 TABLET | Refills: 0 | Status: SHIPPED | OUTPATIENT
Start: 2021-03-07 | End: 2021-03-14

## 2021-03-07 RX ORDER — METHOCARBAMOL 750 MG/1
750 TABLET, FILM COATED ORAL 4 TIMES DAILY
Qty: 40 TABLET | Refills: 0 | Status: SHIPPED | OUTPATIENT
Start: 2021-03-07 | End: 2021-03-17

## 2021-03-07 RX ORDER — ACETAMINOPHEN 500 MG
1000 TABLET ORAL 3 TIMES DAILY
Qty: 60 TABLET | Refills: 0 | Status: SHIPPED | OUTPATIENT
Start: 2021-03-07 | End: 2021-03-18

## 2021-03-07 RX ADMIN — METHOCARBAMOL TABLETS 750 MG: 750 TABLET, COATED ORAL at 09:24

## 2021-03-07 RX ADMIN — DIVALPROEX SODIUM 500 MG: 500 TABLET, EXTENDED RELEASE ORAL at 09:24

## 2021-03-07 RX ADMIN — FAMOTIDINE 20 MG: 20 TABLET ORAL at 09:24

## 2021-03-07 RX ADMIN — SODIUM CHLORIDE, PRESERVATIVE FREE 10 ML: 5 INJECTION INTRAVENOUS at 09:23

## 2021-03-07 ASSESSMENT — PAIN SCALES - GENERAL: PAINLEVEL_OUTOF10: 0

## 2021-03-07 NOTE — PROGRESS NOTES
General Surgery Progress Note:    CC: post op    S: + flatus jose a diet moving bowels.        Objective:  @BP (!) 140/85   Pulse 75   Temp 97.6 °F (36.4 °C) (Temporal)   Resp 18   Ht 5' 9\" (1.753 m)   Wt 227 lb 6.4 oz (103.1 kg)   SpO2 92%   BMI 33.58 kg/m² @    Physical -     Gen: NAD  Resp: Breathing Comfortably, no resp distress clear b/l  CV: Reg Rate  Abd: soft appropriate post op tenderness wounds CDI   EXT NVI    Assessment/Plan: s/p lap r mitzy     Discharge       Lennox Larger MD FACS     11:41 AM

## 2021-03-07 NOTE — PLAN OF CARE
Problem: Pain:  Goal: Pain level will decrease  Description: Pain level will decrease  3/7/2021 0820 by Tommy Goss RN  Outcome: Met This Shift  3/6/2021 2331 by Johan Santiago RN  Outcome: Met This Shift  Goal: Control of acute pain  Description: Control of acute pain  3/7/2021 0820 by Tommy Goss RN  Outcome: Met This Shift  3/6/2021 2331 by Johan Santiago RN  Outcome: Met This Shift  Goal: Control of chronic pain  Description: Control of chronic pain  Outcome: Met This Shift     Problem: Musculor/Skeletal Functional Status  Goal: Highest potential functional level  Outcome: Met This Shift  Goal: Absence of falls  Outcome: Met This Shift     Problem: Falls - Risk of:  Goal: Will remain free from falls  Description: Will remain free from falls  3/7/2021 0820 by Tommy Goss RN  Outcome: Met This Shift  3/6/2021 2331 by Johan Santiago RN  Outcome: Met This Shift  Goal: Absence of physical injury  Description: Absence of physical injury  3/7/2021 0820 by Tommy Goss RN  Outcome: Met This Shift  3/6/2021 2331 by Johan Santiago RN  Outcome: Met This Shift

## 2021-03-18 ENCOUNTER — OFFICE VISIT (OUTPATIENT)
Dept: SURGERY | Age: 61
End: 2021-03-18
Payer: OTHER GOVERNMENT

## 2021-03-18 VITALS
WEIGHT: 217 LBS | RESPIRATION RATE: 14 BRPM | HEART RATE: 93 BPM | HEIGHT: 69 IN | SYSTOLIC BLOOD PRESSURE: 121 MMHG | DIASTOLIC BLOOD PRESSURE: 88 MMHG | BODY MASS INDEX: 32.14 KG/M2 | OXYGEN SATURATION: 97 %

## 2021-03-18 DIAGNOSIS — Z09 POSTOP CHECK: Primary | ICD-10-CM

## 2021-03-18 PROCEDURE — 99024 POSTOP FOLLOW-UP VISIT: CPT | Performed by: SURGERY

## 2021-03-18 PROCEDURE — 99212 OFFICE O/P EST SF 10 MIN: CPT | Performed by: SURGERY

## 2021-03-18 NOTE — LETTER
[facility] General Surgery  [address] [address]  Phone: [phone]  Fax: [phone]    Lev Aly MD      March 18, 2021     Patient: Hesham Gray   YOB: 1960   Date of Visit: 3/18/2021       To Whom it May Concern:    Renata Dixon was seen in my clinic on 3/18/2021. He may return to work on 4/9/2021 with no restrictions. he underwent laparoscopic right hemicolectomy on 3/2/2021. If you have any questions or concerns, please don't hesitate to call.     Sincerely,   Electronically signed by Lev Aly MD on 3/18/2021 at 3:26 PM

## 2021-03-18 NOTE — PROGRESS NOTES
GENERAL SURGERY CLINIC  PROGRESS NOTE  3/18/2021    Cc: postop laparoscopic right hemicolectomy    Subjective:  Patient presents for 2 week visit after laparoscopic right hemicolectomy. No issues since he was discharged home. Has been taking tylenol and robaxin for pain. Just has some twinges of pain when he is carrying objects or turning while driving. Went fishing yesterday. Objective:  /88 (Site: Right Upper Arm, Position: Sitting, Cuff Size: Medium Adult)   Pulse 93   Resp 14   Ht 5' 9\" (1.753 m)   Wt 217 lb (98.4 kg)   SpO2 97%   BMI 32.05 kg/m²     General appearance: alert, cooperative and in no acute distress. Eyes: grossly normal  Lungs: nonlabored breathing on room air  Heart: regular rate  Abdomen: soft, non-tender, non distended. Laparoscopic and midline incisions healing well without celullitis or fluctuance or drainage        Skin: No skin abnormalities  Neurologic: Alert and oriented x 3.  Grossly normal  Musculoskeletal: No clubbing cyanosis or edema    Assessment/Plan:  63 y.o. male 2 weeks s/p laparoscopic right hemicolectomy for unresectable polyp    Pathology discussed with patient - tubular adenoma and sessile serrated polyp, 30 lymph nodes negative  Continue tylenol and or robaxin as needed for pain  Diet as tolerated  Return to clinic in 3 months, sooner if issues    Electronically signed by Marbin Bronson MD on 3/18/2021 at 3:11 PM     I saw and examined the patient  S/p lap right hemicolectomy on 3/2/2021  Tolerating diet/ having bowel movements  Wound healing well        Ok to return to work on 4/9/2021 with no restrictions  Ok to receive covid vaccine  Follow up in 3 months    Electronically signed by Pauline Kruger MD on 3/18/2021 at 3:27 PM

## 2021-06-07 ENCOUNTER — TELEPHONE (OUTPATIENT)
Dept: SURGERY | Age: 61
End: 2021-06-07

## 2021-06-07 NOTE — TELEPHONE ENCOUNTER
MA left message for a return call to reschedule appointment on 06/17/2021. Due to availability the appointment needs scheduled in AM on 06/17 or moved to another day.    Electronically signed by Ananya Valdez on 6/7/21 at 9:42 AM EDT

## 2021-06-09 ENCOUNTER — TELEPHONE (OUTPATIENT)
Dept: SURGERY | Age: 61
End: 2021-06-09

## 2021-06-14 ENCOUNTER — TELEPHONE (OUTPATIENT)
Dept: SURGERY | Age: 61
End: 2021-06-14

## 2021-06-14 NOTE — TELEPHONE ENCOUNTER
Third attempt to reschedule appointment on Thursday 06/17/2021, left message.    Electronically signed by Lucinda Rivers on 6/14/21 at 1:22 PM EDT

## 2021-06-24 ENCOUNTER — OFFICE VISIT (OUTPATIENT)
Dept: SURGERY | Age: 61
End: 2021-06-24
Payer: OTHER GOVERNMENT

## 2021-06-24 VITALS
SYSTOLIC BLOOD PRESSURE: 118 MMHG | BODY MASS INDEX: 31.52 KG/M2 | OXYGEN SATURATION: 98 % | HEIGHT: 70 IN | WEIGHT: 220.2 LBS | DIASTOLIC BLOOD PRESSURE: 77 MMHG | HEART RATE: 74 BPM | TEMPERATURE: 97.9 F

## 2021-06-24 DIAGNOSIS — K63.5 SESSILE COLONIC POLYP: Primary | ICD-10-CM

## 2021-06-24 PROCEDURE — 99212 OFFICE O/P EST SF 10 MIN: CPT | Performed by: SURGERY

## 2021-06-24 PROCEDURE — 99213 OFFICE O/P EST LOW 20 MIN: CPT | Performed by: SURGERY

## 2021-06-24 NOTE — PROGRESS NOTES
[facility]  [facility] Surgery Attending Progress Note    Chief Complaint:  Colon resection follow up       Subjective:   Pt  undewent lap right colectomy on 3/2  She has been taking nothing for pain. Pain is   controlled. No fevers. No chills  Tolerating diet. No nausea/ vomiting    I have reviewed and confirmed the past medical history, surgical history, social history, allergies in the chart. Medications: I have reviewed the medication list in the chart. Review of Systems - History obtained from the patient  General ROS: negative  Psychological ROS: negative  Ophthalmic ROS: negative  Allergy and Immunology ROS: negative  Hematological and Lymphatic ROS: negative  Endocrine ROS: negative  Breast ROS: negative  Respiratory ROS: negative  Cardiovascular ROS: negative  Gastrointestinal ROS: positive for - abdominal pain and gas/bloating  Genito-Urinary ROS: negative  Musculoskeletal ROS: negative      Objective:     Vitals:    06/24/21 1439   BP: 118/77   Pulse: 74   Temp: 97.9 °F (36.6 °C)   SpO2: 98%      PHYSICAL EXAM   PSYCH: mood and affect normal, alert and oriented x 3  CONSTITUTIONAL: No apparent distress, comfortable  EYES: Sclera white, pupils equal round and reactive to light  ENMT:  Hearing normal, trachea midline, ears externally intact  RESP: Breath sounds were clear and equal with no rales, wheezes, or rhonchi. Respiratory effort was normal with no retractions or use of accessory muscles. CV: Heart sounds were normal with a regular rate and rhythm. No pedal edema  GI/ Abdomen: The abdomen was soft and non distended. There was no tenderness, guarding, rebound, or rigidity. There was no                     masses, hepatosplenomegaly, or hernias.  Lower midline healed        Assessment:     Patient Active Problem List   Diagnosis    MDD (major depressive disorder)    Hypertension    Hyperlipidemia    History of colon polyps    S/P laparoscopic colectomy    COVID-19         Plan:   Doing well  S/p lap right hemicolectomy on 3/2  Path report--Ascending colon with one sessile serrated polyp (SSP) and one tubular   adenoma. Ascending colon with submucosal lipoma    Follow up in 2 years for a repeat colonoscopy    On this date 6/24/2021 I have spent 25 minutes reviewing previous notes, test results and face to face with the patient discussing the diagnosis and importance of compliance with the treatment plan as well as documenting on the day of the visit. Mora Bishop MD, FACS  6/24/2021  3:31 PM      NOTE: This report was transcribed using voice recognition software. Every effort was made to ensure accuracy; however, inadvertent computerized transcription errors may be present.

## 2022-08-03 ENCOUNTER — TELEPHONE (OUTPATIENT)
Dept: CASE MANAGEMENT | Age: 62
End: 2022-08-03

## 2022-08-03 NOTE — TELEPHONE ENCOUNTER
I called the patient and he confirmed his CT lung screening at [facility] on 8/4/2022 at 2:30 pm.  I reminded the patient to arrive at 2:00 pm, enter through the main entrance, and register. Patient confirmed.         Electronically signed by Mahi Vargas on 8/3/22 at 12:29 PM EDT

## 2022-08-04 ENCOUNTER — HOSPITAL ENCOUNTER (OUTPATIENT)
Dept: CT IMAGING | Age: 62
Discharge: HOME | End: 2022-08-06
Payer: OTHER GOVERNMENT

## 2022-08-04 DIAGNOSIS — Z72.0 TOBACCO ABUSE: ICD-10-CM

## 2022-08-04 PROCEDURE — 71271 CT THORAX LUNG CANCER SCR C-: CPT

## 2022-08-05 ENCOUNTER — TELEPHONE (OUTPATIENT)
Dept: CASE MANAGEMENT | Age: 62
End: 2022-08-05

## 2022-11-15 NOTE — PROGRESS NOTES
[address] [facility]    General Surgery Attending History and Physical    Patient's Name/Date of Birth: Amanda Soto / 1960 (51 y.o.)    Date: February 18, 2021     CC:multiple colon polyps in cecum that were unable to be removed    HPI:  63 yo patient of Dr Wen Berman. Dr Tiffanie Kirby performed a colonoscopy on 10/5/2020 and he found 10 different polyps. 5 polyps were in the cecum but 2 were unable to be removed. The other 5 in the splenic flexure and sigmoid were removed. He is referred to me to discuss his surgery in order to remove the polyps that could not be removed by cscope. No abdominal pain. No fevers. No chills. Bowel movements are normal.    He is a  and served in the [employer].  It took time for the 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  [facility]    approximately 2015, per patient had polyps removed but no report available for review, pt does not remember where it was done at or who did it    COLONOSCOPY  10/05/2020    Multiple right and left-sided colon polyps partial removed with polypectomy and biopsy cauterization, Dr. Tiffanie Kirby, [address] COLONOSCOPY N/A 10/5/2020    COLONOSCOPY POLYPECTOMY SNARE/COLD BIOPSY performed by Veronika Gonzalez MD at [address] COLONOSCOPY N/A 10/5/2020    COLONOSCOPY POLYPECTOMY ABLATION performed by Veronika Gonzalez MD at [address]    1980s x 2       Current Outpatient Medications   Medication Sig Dispense Refill    divalproex (DEPAKOTE ER) 500 MG extended release tablet Take 500 mg by mouth 2 times daily Take one tablet by mouth every morning and take one tablet at bedtime for mood      MENTHOL-METHYL SALICYLATE EX Apply topically Cream. Apply small amount to affected area three times a day if needed for pain      QUEtiapine (SEROQUEL XR) 50 MG extended release tablet Take 50 mg by mouth nightly Take one tablet by mouth at bedtime for mood.  simvastatin (ZOCOR) 20 MG tablet Take 20 mg by mouth nightly Take one tablet by mouth at bedtime for cholesterol       No current facility-administered medications for this visit. No Known Allergies    Family History   Problem Relation Age of Onset    Kidney Disease Mother     Heart Attack Father     Heart Disease Father        Social History     Socioeconomic History    Marital status: Legally      Spouse name: Not on file    Number of children: Not on file    Years of education: Not on file    Highest education level: Not on file   Occupational History    Not on file   Social Needs    Financial resource strain: Not on file    Food insecurity     Worry: Not on file     Inability: Not on file   [employer] needs     Medical: Not on file     Non-medical: Not on file   Tobacco Use    Smoking status: Current Every Day Smoker     Packs/day: 1.00     Years: 40.00     Pack years: 40.00     Types: Cigarettes    Smokeless tobacco: Never Used   Substance and Sexual Activity    Alcohol use:  Yes     Alcohol/week: 3.0 standard drinks     Types: 3 Cans of beer per week     Comment: Thursdays - about 3 beers     Drug use: No    Sexual activity: Not on file   Lifestyle    Physical activity     Days per week: Not on file     Minutes per session: Not on file    Stress: Not on file   Relationships    Social connections     Talks on phone: Not on file     Gets together: Not on file     Attends Advent service: Not on file     Active member of club or organization: Not on file     Attends meetings of clubs or organizations: Not on file     Relationship status: Not on file    Intimate partner violence     Fear of current or ex partner: Not on file     Emotionally abused: Not on file     Physically abused: Not on file     Forced sexual activity: Not on file   Other Topics Concern    Not on file   Social History Narrative    Not on file       ROS:  Review of Systems - History obtained from the patient  General ROS: negative  Psychological ROS: negative  Ophthalmic ROS: negative  Allergy and Immunology ROS: negative  Hematological and Lymphatic ROS: negative  Endocrine ROS: negative  Breast ROS: negative  Respiratory ROS: negative  Cardiovascular ROS: negative  Gastrointestinal ROS: negative  Genito-Urinary ROS: negative  Musculoskeletal ROS: negative        Physical Exam:  Vitals:    02/18/21 1008   BP: (!) 152/93   Pulse: 83   Resp: 16   Temp: 96.9 °F (36.1 °C)   SpO2: 92%       PSYCH: mood and affect normal, alert and oriented x 3  CONSTITUTIONAL: No apparent distress, comfortable  EYES: Sclera white, pupils equal round and reactive to light  ENMT:  Hearing normal, trachea midline, ears externally intact  LYMPH: no lympadenopathy in neck. No lympadenopathy in groins  RESP: Breath sounds were clear and equal with no rales, wheezes, or rhonchi. Respiratory effort was normal with no retractions or use of accessory muscles. CV: Heart sounds were normal with a regular rate and rhythm. No pedal edema  GI/ Abdomen: The abdomen was soft and non distended. There was no tenderness, guarding, rebound, or rigidity. There was no                     masses, hepatosplenomegaly, or hernias. No inguinal hernias were noted on coughing and straining.   Rectal -deferred  MSK: no clubbing/ no cyanosis/ gait normal       Assessment/Plan:  Unresectable colon polyps in cecum  (image from Dr Sylvia Cortez colonoscopy report from 10/5/2020)           I have discussed the risks, benefits, and alternatives to laparoscopic possible open right colon resection. I have detailed the risks of bleeding and infection from the surgery. There is a small risk of damage to adjacent structures such as the ureter and bladder. After any surgery, there is a small risk for development of hernias from prior surgery sites. I informed the patient that if there is gross contamination and I unable to perform an anastomosis, I will give the patient a stoma bag. I also discussed with the patient that if an anasatamosis is performed, there is a 3% chance that the connection leaks, and she will need continued IV Antibiotics and a possible drain. I have also detailed that if I am unable to proceed laparoscopically, I will proceed with an open procedure. The patient understands the above and agrees to proceed. Will order nickel's prep using suprep and oral abx    Informed pt that he will need 2-4 weeks off. On this date 02/18/21 I have spent 45 minutes reviewing previous notes, test results and face to face with the patient discussing the diagnosis and importance of compliance with the treatment plan as well as documenting on the day of the visit. Nikos Whitaker MD, FACS  2/18/2021  10:38 AM     NOTE: This report was transcribed using voice recognition software. Every effort was made to ensure accuracy; however, inadvertent computerized transcription errors may be present. temporal

## 2023-04-24 ENCOUNTER — HOSPITAL ENCOUNTER (OUTPATIENT)
Age: 63
Discharge: HOME | End: 2023-04-26

## 2023-04-24 PROCEDURE — 88305 TISSUE EXAM BY PATHOLOGIST: CPT

## 2023-05-28 ENCOUNTER — HOSPITAL ENCOUNTER (OUTPATIENT)
Dept: MRI IMAGING | Age: 63
Discharge: HOME | End: 2023-05-30
Payer: COMMERCIAL

## 2023-05-28 DIAGNOSIS — S83.92XA SPRAIN OF LEFT KNEE, INITIAL ENCOUNTER: ICD-10-CM

## 2023-05-28 PROCEDURE — 73721 MRI JNT OF LWR EXTRE W/O DYE: CPT

## 2023-06-19 ENCOUNTER — OFFICE VISIT (OUTPATIENT)
Dept: ORTHOPEDIC SURGERY | Age: 63
End: 2023-06-19
Payer: COMMERCIAL

## 2023-06-19 VITALS — WEIGHT: 220 LBS | BODY MASS INDEX: 31.5 KG/M2 | HEIGHT: 70 IN

## 2023-06-19 DIAGNOSIS — M25.562 LEFT KNEE PAIN, UNSPECIFIED CHRONICITY: Primary | ICD-10-CM

## 2023-06-19 PROCEDURE — 99204 OFFICE O/P NEW MOD 45 MIN: CPT | Performed by: ORTHOPAEDIC SURGERY

## 2023-06-19 NOTE — PROGRESS NOTES
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 He has had continued pain to the knee while weightbearing and while walking down hill. HPI:      Tori Sommers is a 66y.o. year old male who is seen today  for evaluation of left knee pain. Patient report reports Worker's Comp. injury suffered on February 17th. States that while pulling a cart he felt a sharp pain in his knee. Denies twisting injury or fall. Initial treatment through [facility] included obtaining an MRI. Reports pain to the medial aspect of his knee. Denies feelings of instability. Denies locking of the knee or other mechanical symptoms. Reports symptoms remain unchanged. He is currently employed working for the [employer].       PAST MEDICAL HISTORY  Past Medical History:   Diagnosis Date    Anxiety     Bipolar disorder ([address]     Hyperlipidemia     Hypertension     Major depressive disorder     PTSD (post-traumatic stress disorder)        PAST SURGICAL HISTORY  Past Surgical History:   Procedure Laterality Date    APPENDECTOMY  2018    laparoscopic, acute appenditis, [place], [place]    COLONOSCOPY  2015    approximately 2015, per patient had polyps removed but no report available for review, pt does not remember where it was done at or who did it    COLONOSCOPY  10/05/2020    Multiple right and left-sided colon polyps partial removed with polypectomy and biopsy cauterization, Dr. Hitesh Pteers, [facility]    COLONOSCOPY N/A 10/5/2020    COLONOSCOPY POLYPECTOMY SNARE/COLD BIOPSY performed by Hai Walls MD at [address] [facility] N/A 10/5/2020    COLONOSCOPY

## 2023-07-05 DIAGNOSIS — M25.562 LEFT KNEE PAIN, UNSPECIFIED CHRONICITY: Primary | ICD-10-CM

## 2023-07-18 ENCOUNTER — OFFICE VISIT (OUTPATIENT)
Dept: ORTHOPEDIC SURGERY | Age: 63
End: 2023-07-18

## 2023-07-18 VITALS — BODY MASS INDEX: 31.5 KG/M2 | HEIGHT: 70 IN | WEIGHT: 220 LBS

## 2023-07-18 DIAGNOSIS — M25.562 LEFT KNEE PAIN, UNSPECIFIED CHRONICITY: Primary | ICD-10-CM

## 2023-07-18 RX ORDER — TRIAMCINOLONE ACETONIDE 40 MG/ML
40 INJECTION, SUSPENSION INTRA-ARTICULAR; INTRAMUSCULAR ONCE
Status: COMPLETED | OUTPATIENT
Start: 2023-07-18 | End: 2023-07-18

## 2023-07-18 RX ORDER — BUPIVACAINE HYDROCHLORIDE 2.5 MG/ML
2 INJECTION, SOLUTION INFILTRATION; PERINEURAL ONCE
Status: COMPLETED | OUTPATIENT
Start: 2023-07-18 | End: 2023-07-18

## 2023-07-18 RX ADMIN — BUPIVACAINE HYDROCHLORIDE 5 MG: 2.5 INJECTION, SOLUTION INFILTRATION; PERINEURAL at 15:29

## 2023-07-18 RX ADMIN — TRIAMCINOLONE ACETONIDE 40 MG: 40 INJECTION, SUSPENSION INTRA-ARTICULAR; INTRAMUSCULAR at 15:30

## 2023-07-18 NOTE — PROGRESS NOTES
[facility]   ORTHOPAEDIC SURGERY AND SPORTS MEDICINE  DATE OF VISIT: 07/18/23  Follow Up Visit     CHIEF COMPLAINT:   Chief Complaint   Patient presents with    Injections     Workers comp approval for left knee cortisone injection. HPI:    Alison Feng is a 66y.o. year old male who presented to the office today for follow up of left knee pain, medial compartment osteoarthritis, ACL degeneration, posterior medial meniscus root tear, previously evaluated on 6/19/2023. Previous treatment has included obtaining approval through [employer] to begin physical therapy and corticosteroid action. Patient has a C9 approval to proceed with corticosteroid injection. He has recently begun physical therapy. Report symptoms remain unchanged. REVIEW OF SYSTEMS:     General: Negative Fever, chills, fatigue   Cardiovascular: Negative chest pain, palpitations  Respiratory: Equal chest expansion, negative shortness of breath   Skin: Negative rash, open wounds  Psych: Normal affect, mood stable  Neurologic: sensation grossly intact in extremities   Musculoskeletal: See HPI      Physical Exam:     Height: 5' 10\" (1.778 m), Weight - Scale: 220 lb (99.8 kg)    General: Alert and oriented x3, no acute distress  Cardiovascular/pulmonary: No labored breathing, peripheral perfusion intact  Musculoskeletal:    Left knee exam displays no swelling or deformity. Range of motion full. Stable valgus/varus stress at 03 degrees. Stable patella tracked midline. Positive patellar crepitus. Controlled Substances Monitoring:      Imaging:  Previous imaging reviewed    Procedure Note: Knee Cortisone injection     The left knee was identified as the injection site. The risk and benefits of a cortisone injection were explained and the patient consented to the injection. Under sterile conditions, the knee was injected with a mixture of 40 mg of Kenalog and 2 mils of 0.25% Marcaine without complication.  A sterile bandage

## 2023-07-24 ENCOUNTER — TRANSCRIBE ORDERS (OUTPATIENT)
Dept: ADMINISTRATIVE | Age: 63
End: 2023-07-24

## 2023-07-24 DIAGNOSIS — Z72.0 TOBACCO ABUSE: Primary | ICD-10-CM

## 2023-08-21 ENCOUNTER — TELEPHONE (OUTPATIENT)
Dept: CASE MANAGEMENT | Age: 63
End: 2023-08-21

## 2023-08-21 NOTE — TELEPHONE ENCOUNTER
I called the patient and he confirmed his CT lung screening at [facility] on 8/22/2023 at 2:30 pm.  I reminded the patient to arrive at 2:00 pm, enter through the main entrance, and register. Patient confirmed. I faxed twice the physician questionnaire form and I have not received anything back yet.        Electronically signed by Lindsay Mathias on 8/21/23 at 10:02 AM EDT

## 2023-08-22 ENCOUNTER — HOSPITAL ENCOUNTER (OUTPATIENT)
Dept: CT IMAGING | Age: 63
Discharge: HOME | End: 2023-08-24
Payer: OTHER GOVERNMENT

## 2023-08-22 DIAGNOSIS — Z72.0 TOBACCO ABUSE: ICD-10-CM

## 2023-08-22 PROCEDURE — 71271 CT THORAX LUNG CANCER SCR C-: CPT

## 2023-08-23 ENCOUNTER — TELEPHONE (OUTPATIENT)
Dept: CASE MANAGEMENT | Age: 63
End: 2023-08-23

## 2023-08-23 NOTE — TELEPHONE ENCOUNTER
No call, encounter opened to process CT Lung Screening. CT Lung Screen: 8/22/2023    IMPRESSION:  Stable bilateral 2-3 mm pulmonary nodules. Incidental findings as described, including faint coronary artery  calcifications which may be a marker for coronary artery disease. LUNG RADS:  Lung-RADS 2 - Benign ()     Management:  12 month screening LDCT     RECOMMENDATIONS:  If you would like to register your patient with the [facility], please contact the Nurse Navigator at  [phone]. Pack years: 36    Social History     Tobacco Use  Smoking Status: Current Every Day Smoker    Start Date:    Quit Date:    Types: Cigarettes   Packs/Day: 1   Years: 36   Pack Years: 36   Smokeless Tobacco: Never         Results letter sent to patient via my chart or mailed.      [address]

## 2023-09-26 ENCOUNTER — OFFICE VISIT (OUTPATIENT)
Dept: ORTHOPEDIC SURGERY | Age: 63
End: 2023-09-26
Payer: COMMERCIAL

## 2023-09-26 VITALS — BODY MASS INDEX: 31.5 KG/M2 | WEIGHT: 220 LBS | HEIGHT: 70 IN

## 2023-09-26 DIAGNOSIS — M25.562 LEFT KNEE PAIN, UNSPECIFIED CHRONICITY: Primary | ICD-10-CM

## 2023-09-26 PROCEDURE — 99213 OFFICE O/P EST LOW 20 MIN: CPT | Performed by: NURSE PRACTITIONER

## 2023-09-26 NOTE — PROGRESS NOTES
[facility]   ORTHOPAEDIC SURGERY AND SPORTS MEDICINE  DATE OF VISIT: 09/26/23  Follow Up Visit     CHIEF COMPLAINT:   Chief Complaint   Patient presents with    Follow-up     Left knee  had injection 7/18 still having pain but has helped jabbing pain 60% or more   he is getting PT  and  was told to come back in from workers comp       HPI:    Modesto Coulter is a 66y.o. year old male who presented to the office today for follow up of on left knee, previously evaluated on 07/18/2023. Previous treatment has included injection  . He reports symptoms are improved about 60%. The patient has responded to the treatment. He is progressing well with PT.      REVIEW OF SYSTEMS:     General: Negative Fever, chills, fatigue   Cardiovascular: Negative chest pain, palpitations  Respiratory: Equal chest expansion, negative shortness of breath   Skin: Negative rash, open wounds  Psych: Normal affect, mood stable  Neurologic: sensation grossly intact in extremities   Musculoskeletal: See HPI      Physical Exam:     Height: 5' 10\" (1.778 m), Weight - Scale: 220 lb (99.8 kg)    General: Alert and oriented x3, no acute distress  Cardiovascular/pulmonary: No labored breathing, peripheral perfusion intact  Musculoskeletal:    Left knee exam displays full range of motion, negative joint line tenderness. Stable valgus varus stress testing. Skin is warm dry intact. Negative swelling or deformity. Patella tracked midline. Lachman test displays positive soft endpoint    Controlled Substances Monitoring:      Imaging:  Reviewed      Assessment: Left knee posterior medial meniscus root tear, ACL degeneration, medial compartment osteoarthritis      Plan: Today we discussed his left knee. He reports symptom relief following corticosteroid injection in July. Pain is not completely resolved and he is still progressing with PT. He still has some occasional feelings of instability.   We will look to extend physical therapy as he

## 2023-10-17 ENCOUNTER — OFFICE VISIT (OUTPATIENT)
Dept: ORTHOPEDIC SURGERY | Age: 63
End: 2023-10-17
Payer: COMMERCIAL

## 2023-10-17 VITALS — BODY MASS INDEX: 31.5 KG/M2 | WEIGHT: 220 LBS | HEIGHT: 70 IN

## 2023-10-17 DIAGNOSIS — M25.562 CHRONIC PAIN OF LEFT KNEE: Primary | ICD-10-CM

## 2023-10-17 DIAGNOSIS — G89.29 CHRONIC PAIN OF LEFT KNEE: Primary | ICD-10-CM

## 2023-10-17 PROCEDURE — 99213 OFFICE O/P EST LOW 20 MIN: CPT | Performed by: NURSE PRACTITIONER

## 2023-10-17 NOTE — PROGRESS NOTES
[facility]   ORTHOPAEDIC SURGERY AND SPORTS MEDICINE  DATE OF VISIT: 10/17/23  Follow Up Visit     CHIEF COMPLAINT:   Chief Complaint   Patient presents with    Knee Pain     Pt is here today for left knee evaluation post therapy         HPI:    Saumya Rodriguez is a 66y.o. year old male who presented to the office today for follow up of left knee pain, previously evaluated on 9/26/2023. Previous treatment has included corticosteroid injection on 7/18/2023, currently in physical therapy. Patient states that following his last visit he has noticed pain has been increasing with daily activities and PT. REVIEW OF SYSTEMS:     General: Negative Fever, chills, fatigue   Cardiovascular: Negative chest pain, palpitations  Respiratory: Equal chest expansion, negative shortness of breath   Skin: Negative rash, open wounds  Psych: Normal affect, mood stable  Neurologic: sensation grossly intact in extremities   Musculoskeletal: See HPI      Physical Exam:     Height: 1.778 m (5' 10\"), Weight - Scale: 99.8 kg (220 lb)    General: Alert and oriented x3, no acute distress  Cardiovascular/pulmonary: No labored breathing, peripheral perfusion intact  Musculoskeletal:    Exam of left knee displays full motion. Negative swelling or palpable effusion. Valgus varus stress test were intact. Positive Lachman test    Controlled Substances Monitoring:      Imaging:  Reviewed      Assessment:  Left knee posterior medial meniscus root tear, ACL degeneration, medial compartment osteoarthritis      Plan: Today we discussed the left knee. He is currently in outpatient PT. reports that he has noticed pain steadily worsening over last couple week since he was last evaluated. He had adequate symptom relief following corticosteroid injection in July. Given recent increase in his pain we will look to have an injection approved through [email or web address]. Patient is in agreement with plan of care today.   He will continue with PT

## 2023-11-06 ENCOUNTER — TELEPHONE (OUTPATIENT)
Dept: ORTHOPEDIC SURGERY | Age: 63
End: 2023-11-06

## 2023-11-06 NOTE — TELEPHONE ENCOUNTER
Pt states he will call back to scheduled his left knee cortisone injections that's been approved through workers comp.

## 2023-11-10 ENCOUNTER — OFFICE VISIT (OUTPATIENT)
Dept: ORTHOPEDIC SURGERY | Age: 63
End: 2023-11-10

## 2023-11-10 DIAGNOSIS — M25.562 CHRONIC PAIN OF LEFT KNEE: Primary | ICD-10-CM

## 2023-11-10 DIAGNOSIS — G89.29 CHRONIC PAIN OF LEFT KNEE: Primary | ICD-10-CM

## 2023-11-10 RX ORDER — TRIAMCINOLONE ACETONIDE 40 MG/ML
40 INJECTION, SUSPENSION INTRA-ARTICULAR; INTRAMUSCULAR ONCE
Status: COMPLETED | OUTPATIENT
Start: 2023-11-10 | End: 2023-11-10

## 2023-11-10 RX ORDER — BUPIVACAINE HYDROCHLORIDE 2.5 MG/ML
1 INJECTION, SOLUTION INFILTRATION; PERINEURAL ONCE
Status: COMPLETED | OUTPATIENT
Start: 2023-11-10 | End: 2023-11-10

## 2023-11-10 RX ADMIN — BUPIVACAINE HYDROCHLORIDE 2.5 MG: 2.5 INJECTION, SOLUTION INFILTRATION; PERINEURAL at 10:57

## 2023-11-10 RX ADMIN — TRIAMCINOLONE ACETONIDE 40 MG: 40 INJECTION, SUSPENSION INTRA-ARTICULAR; INTRAMUSCULAR at 10:58

## 2023-11-10 NOTE — PROGRESS NOTES
Action  Given Dose  40 mg Route  Intra-artICUlar Administered By  Leah Rucker RN                      Assessment/Plan: S/p left knee intra-articular corticosteroid injection for left knee posterior medial meniscus root tear, ACL degeneration, medial compartment osteoarthritis  -Continue activity modification/NSAIDS/ICE prn  -f/u 2 months for reevaluation with Dr. Axel Silva APRN-CNP  Orthopedic Surgery   11/10/23  11:13 AM

## 2024-01-10 ENCOUNTER — OFFICE VISIT (OUTPATIENT)
Dept: ORTHOPEDIC SURGERY | Age: 64
End: 2024-01-10

## 2024-01-10 VITALS — BODY MASS INDEX: 34.8 KG/M2 | WEIGHT: 235 LBS | HEIGHT: 69 IN

## 2024-01-10 DIAGNOSIS — M25.562 CHRONIC PAIN OF LEFT KNEE: Primary | ICD-10-CM

## 2024-01-10 DIAGNOSIS — G89.29 CHRONIC PAIN OF LEFT KNEE: Primary | ICD-10-CM

## 2024-01-10 NOTE — PROGRESS NOTES
[facility]   ORTHOPAEDIC SURGERY AND SPORTS MEDICINE  DATE OF VISIT: 01/10/24  Follow Up Visit Workers Comp    CHIEF COMPLAINT:   Chief Complaint   Patient presents with    Work Related Injury     Follow up, c/o medial aspect of the knee, wakes him up at night when side sleeping - S/p left knee intra-articular corticosteroid injection for left knee posterior medial meniscus root tear, ACL degeneration, medial compartment osteoarthritis    Pain     Left knee - 4 / 10     Injections     CSI 11/10/2023       HPI:    Jones Michel is a 63 y.o. year old male who presented to the office today for follow up of left knee intra-articular corticosteroid injection for left knee posterior medial meniscus root tear, ACL degeneration, medial compartment osteoarthritis , previously evaluated on 11/10/2023. He reports symptoms are improved. The patient has responded to the treatment.    REVIEW OF SYSTEMS:     Constitutional:  Negative for weight loss, fevers, chills, fatigue  Cardiovascular: Negative for chest pain, palpitations  Pulmonary: Negative for shortness of breath, labored breathing, cough  GI: negative for abdominal pain, nausea, vomiting   MSK: per HPI  Skin: negative for rash, open wounds    All other systems reviewed and are negative       Physical Exam:     Height: 1.753 m (5' 9\"), Weight - Scale: 106.6 kg (235 lb) (per pt)    General: Alert and oriented x3, no acute distress  Cardiovascular/pulmonary: No labored breathing, peripheral perfusion intact  Musculoskeletal:    Left knee exam displays full range of motion.  Negative joint line tenderness.  Stable valgus and varus stress testing.  Patella stable tracking midline.  Knee is grossly stable on exam.  Positive Lachman test.  Stable posterior drawer test.    Controlled Substances Monitoring:      Imaging:  MRI findings reviewed showing evidence of degenerative changes with osteophyte formation, posterior medial meniscus root tear with meniscal extrusion.

## 2024-06-24 ENCOUNTER — OFFICE VISIT (OUTPATIENT)
Dept: ORTHOPEDIC SURGERY | Age: 64
End: 2024-06-24
Payer: COMMERCIAL

## 2024-06-24 VITALS — BODY MASS INDEX: 34.8 KG/M2 | WEIGHT: 235 LBS | HEIGHT: 69 IN

## 2024-06-24 DIAGNOSIS — G89.29 CHRONIC PAIN OF LEFT KNEE: Primary | ICD-10-CM

## 2024-06-24 DIAGNOSIS — M25.562 CHRONIC PAIN OF LEFT KNEE: Primary | ICD-10-CM

## 2024-06-24 PROCEDURE — 99213 OFFICE O/P EST LOW 20 MIN: CPT | Performed by: NURSE PRACTITIONER

## 2024-08-26 ENCOUNTER — HOSPITAL ENCOUNTER (OUTPATIENT)
Dept: CT IMAGING | Age: 64
Discharge: HOME | End: 2024-08-28
Payer: OTHER GOVERNMENT

## 2024-08-26 DIAGNOSIS — R91.1 PULMONARY NODULE: ICD-10-CM

## 2024-08-26 PROCEDURE — 71250 CT THORAX DX C-: CPT

## 2024-10-21 ENCOUNTER — TELEPHONE (OUTPATIENT)
Dept: ADMINISTRATIVE | Age: 64
End: 2024-10-21

## 2024-10-21 NOTE — TELEPHONE ENCOUNTER
RAFAEL SÁNCHEZ to schedule patient. Patient has a referral but it was closed after RAFAEL SÁNCHEZ x3 to schedule.     Electronically signed by Salima Winters MA on 10/21/24 at 9:52 AM EDT

## 2024-10-21 NOTE — TELEPHONE ENCOUNTER
Patient called back and scheduled for 10/23/24.    Electronically signed by Salima Winters MA on 10/21/24 at 10:06 AM EDT

## 2024-10-21 NOTE — TELEPHONE ENCOUNTER
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

## 2024-10-23 ENCOUNTER — OFFICE VISIT (OUTPATIENT)
Dept: ENT CLINIC | Age: 64
End: 2024-10-23
Payer: OTHER GOVERNMENT

## 2024-10-23 VITALS
HEIGHT: 69 IN | SYSTOLIC BLOOD PRESSURE: 126 MMHG | HEART RATE: 70 BPM | WEIGHT: 242 LBS | OXYGEN SATURATION: 98 % | BODY MASS INDEX: 35.84 KG/M2 | DIASTOLIC BLOOD PRESSURE: 84 MMHG

## 2024-10-23 DIAGNOSIS — Z85.828 HISTORY OF SQUAMOUS CELL CARCINOMA OF SKIN: ICD-10-CM

## 2024-10-23 DIAGNOSIS — C44.92 CANCER OF SKIN, SQUAMOUS CELL: Primary | ICD-10-CM

## 2024-10-23 PROCEDURE — 3079F DIAST BP 80-89 MM HG: CPT | Performed by: OTOLARYNGOLOGY

## 2024-10-23 PROCEDURE — 99203 OFFICE O/P NEW LOW 30 MIN: CPT | Performed by: OTOLARYNGOLOGY

## 2024-10-23 PROCEDURE — 3074F SYST BP LT 130 MM HG: CPT | Performed by: OTOLARYNGOLOGY

## 2024-10-23 ASSESSMENT — ENCOUNTER SYMPTOMS
RESPIRATORY NEGATIVE: 1
EYES NEGATIVE: 1

## 2024-10-23 NOTE — PROGRESS NOTES
Subjective:     Patient ID:  Jones Michel is a 64 y.o. male.    HPI:    Patient presents today for hx of SCC of the nose. Was seen at [facility] on 9/4/24 and biopsy was taken from left nare 1.5cm returning as squamous cell carcinoma. Condition has been present for several year(s). [facility] however [facility] not have the [facility] report with him today.    Past Medical History:   Diagnosis Date    Anxiety     Bipolar disorder (HCC)     Hyperlipidemia     Hypertension     Major depressive disorder     PTSD (post-traumatic stress disorder)      Past Surgical History:   Procedure Laterality Date    APPENDECTOMY  2018    laparoscopic, acute appenditis, [place], [place]    COLONOSCOPY  2015    approximately 2015, per patient had polyps removed but no report available for review, pt does not remember where it was done at or who did it    COLONOSCOPY  10/05/2020    Multiple right and left-sided colon polyps partial removed with polypectomy and biopsy cauterization, Dr. Sarabia, [facility]    COLONOSCOPY N/A 10/5/2020    COLONOSCOPY POLYPECTOMY SNARE/COLD BIOPSY performed by Yordan Sarabia MD at [facility] ENDOSCOPY    COLONOSCOPY N/A 10/5/2020    COLONOSCOPY POLYPECTOMY ABLATION performed by Yordan Sarabia MD at [facility] ENDOSCOPY    HEMICOLECTOMY Right 3/2/2021    RIGHT HEMICOLECTOMY LAPAROSCOPIC performed by Sav Gongora MD at [facility] OR    TOOTH EXTRACTION  1980 1980s x 2     Family History   Problem Relation Age of Onset    Kidney Disease Mother     Heart Attack Father     Heart Disease Father      Social History     Socioeconomic History    Marital status: Legally      Spouse name: None    Number of children: None    Years of education: None    Highest education level: None   Tobacco Use    Smoking status: Every Day     Current packs/day: 1.00     Average packs/day: 1 pack/day for 40.0 years (40.0 ttl pk-yrs)     Types: Cigarettes    Smokeless tobacco: Never   Vaping Use

## 2024-11-18 ENCOUNTER — TELEPHONE (OUTPATIENT)
Dept: ENT CLINIC | Age: 64
End: 2024-11-18

## 2024-11-18 NOTE — TELEPHONE ENCOUNTER
Sarita from [facility] called and would like an update regarding surgery/plan of care after patient's scheduled f/u with Dr. Gallardo on 11/22.     Electronically signed by Carly Guillermo MA on 11/18/24 at 11:27 AM EST

## 2024-11-22 ENCOUNTER — OFFICE VISIT (OUTPATIENT)
Dept: ENT CLINIC | Age: 64
End: 2024-11-22
Payer: OTHER GOVERNMENT

## 2024-11-22 VITALS
RESPIRATION RATE: 14 BRPM | WEIGHT: 243 LBS | HEIGHT: 69 IN | SYSTOLIC BLOOD PRESSURE: 119 MMHG | OXYGEN SATURATION: 98 % | TEMPERATURE: 97.6 F | HEART RATE: 70 BPM | BODY MASS INDEX: 35.99 KG/M2 | DIASTOLIC BLOOD PRESSURE: 80 MMHG

## 2024-11-22 DIAGNOSIS — C44.321 SQUAMOUS CELL CANCER OF SKIN OF NOSE: Primary | ICD-10-CM

## 2024-11-22 DIAGNOSIS — C44.92 CANCER OF SKIN, SQUAMOUS CELL: Primary | ICD-10-CM

## 2024-11-22 PROCEDURE — 99213 OFFICE O/P EST LOW 20 MIN: CPT | Performed by: OTOLARYNGOLOGY

## 2024-11-22 PROCEDURE — 3079F DIAST BP 80-89 MM HG: CPT | Performed by: OTOLARYNGOLOGY

## 2024-11-22 PROCEDURE — 3074F SYST BP LT 130 MM HG: CPT | Performed by: OTOLARYNGOLOGY

## 2024-12-02 LAB — SURGICAL PATHOLOGY REPORT: NORMAL

## 2024-12-03 NOTE — PROGRESS NOTES
Path reviewed. Referral placed for Dr. Moore of Plastic Surgery as discussed in office. Called patient and left voicemail to call Dr. Gallardo's office with any questions.     Aaron Silva DO,  Resident Physician, PGY-4  Department of Otolaryngology, [facility]

## 2024-12-20 ENCOUNTER — TELEPHONE (OUTPATIENT)
Dept: ENT CLINIC | Age: 64
End: 2024-12-20

## 2024-12-20 NOTE — TELEPHONE ENCOUNTER
[facility] called requesting office noes from referralvisit. Notes faxed to [phone] Juju.

## 2025-01-09 ENCOUNTER — OFFICE VISIT (OUTPATIENT)
Dept: SURGERY | Age: 65
End: 2025-01-09
Payer: OTHER GOVERNMENT

## 2025-01-09 ENCOUNTER — TELEPHONE (OUTPATIENT)
Dept: SURGERY | Age: 65
End: 2025-01-09

## 2025-01-09 VITALS
BODY MASS INDEX: 36.29 KG/M2 | OXYGEN SATURATION: 93 % | HEART RATE: 83 BPM | WEIGHT: 245 LBS | TEMPERATURE: 98 F | DIASTOLIC BLOOD PRESSURE: 47 MMHG | SYSTOLIC BLOOD PRESSURE: 140 MMHG | HEIGHT: 69 IN

## 2025-01-09 DIAGNOSIS — C44.92 SCC (SQUAMOUS CELL CARCINOMA): Primary | ICD-10-CM

## 2025-01-09 PROCEDURE — 99204 OFFICE O/P NEW MOD 45 MIN: CPT | Performed by: PLASTIC SURGERY

## 2025-01-09 PROCEDURE — 3078F DIAST BP <80 MM HG: CPT | Performed by: PLASTIC SURGERY

## 2025-01-09 PROCEDURE — 3077F SYST BP >= 140 MM HG: CPT | Performed by: PLASTIC SURGERY

## 2025-01-09 NOTE — TELEPHONE ENCOUNTER
Surgery has been scheduled at [address] on 1/21/25, Pre-Admission Testing will call you prior to surgery to inform you arrival time and any other additional directions,if they are unable to reach you,please call them two days prior at [phone].  If taking Fish Oil, Vitamins, two weeks prior to surgery stop taking. If taking NSAIDS (such as Aspirin, Ibuprofen) anticoagulants please consult with your prescribing physician to get further instructions on when to stop medication prior to surgery that is scheduled, patient understood.    Pre-Auth #:  CPT Codes:   ICD 10:

## 2025-01-09 NOTE — PROGRESS NOTES
ERROR    
have been performed by me. I agree with the assessment, exam, plan and orders as documented by the resident.        I attest that the patient was seen and examined by me, and concur with the documentation above. I agree with the assessment and the plan outlined.    This document is generated, in part, by voice recognition software and thus  syntax and grammatical errors are possible.    Kalen Moore

## 2025-01-14 ENCOUNTER — PREP FOR PROCEDURE (OUTPATIENT)
Dept: SURGERY | Age: 65
End: 2025-01-14

## 2025-01-14 DIAGNOSIS — C44.320 SQUAMOUS CELL CARCINOMA, FACE: ICD-10-CM

## 2025-01-22 ENCOUNTER — TELEPHONE (OUTPATIENT)
Dept: SURGERY | Age: 65
End: 2025-01-22

## 2025-01-22 NOTE — TELEPHONE ENCOUNTER
Tianna ROSE needed to verify CPT codes for pre certification work on patient. Her number is [phone]

## 2025-01-24 ENCOUNTER — TELEPHONE (OUTPATIENT)
Dept: SURGERY | Age: 65
End: 2025-01-24

## 2025-03-06 ENCOUNTER — TRANSCRIBE ORDERS (OUTPATIENT)
Age: 65
End: 2025-03-06

## 2025-03-06 DIAGNOSIS — Z01.810 PRE-OPERATIVE CARDIOVASCULAR EXAMINATION: Primary | ICD-10-CM

## 2025-03-11 ENCOUNTER — TELEPHONE (OUTPATIENT)
Dept: SURGERY | Age: 65
End: 2025-03-11

## 2025-03-11 NOTE — TELEPHONE ENCOUNTER
Patent stated this week 3/11/2025  scheduled for stress test and echocardiogram once our office receives clearance we can then proceed with scheduling

## 2025-03-14 ENCOUNTER — HOSPITAL ENCOUNTER (OUTPATIENT)
Age: 65
Discharge: HOME | End: 2025-03-16
Payer: OTHER GOVERNMENT

## 2025-03-14 ENCOUNTER — HOSPITAL ENCOUNTER (OUTPATIENT)
Dept: NUCLEAR MEDICINE | Age: 65
Discharge: HOME | End: 2025-03-14
Payer: OTHER GOVERNMENT

## 2025-03-14 VITALS — WEIGHT: 245 LBS | BODY MASS INDEX: 36.29 KG/M2 | HEIGHT: 69 IN

## 2025-03-14 DIAGNOSIS — Z01.810 PRE-OPERATIVE CARDIOVASCULAR EXAMINATION: ICD-10-CM

## 2025-03-14 LAB
ECHO BSA: 2.33 M2
NUC STRESS EJECTION FRACTION: 75 %
STRESS BASELINE DIAS BP: 88 MMHG
STRESS BASELINE HR: 66 BPM
STRESS BASELINE SYS BP: 136 MMHG
STRESS ESTIMATED WORKLOAD: 1 METS
STRESS PEAK DIAS BP: 88 MMHG
STRESS PEAK SYS BP: 136 MMHG
STRESS PERCENT HR ACHIEVED: 55 %
STRESS POST PEAK HR: 86 BPM
STRESS RATE PRESSURE PRODUCT: NORMAL BPM*MMHG
STRESS TARGET HR: 157 BPM

## 2025-03-14 PROCEDURE — 78452 HT MUSCLE IMAGE SPECT MULT: CPT

## 2025-03-14 PROCEDURE — A9500 TC99M SESTAMIBI: HCPCS | Performed by: RADIOLOGY

## 2025-03-14 PROCEDURE — 6360000002 HC RX W HCPCS: Performed by: PHYSICIAN ASSISTANT

## 2025-03-14 PROCEDURE — 93017 CV STRESS TEST TRACING ONLY: CPT

## 2025-03-14 PROCEDURE — 3430000000 HC RX DIAGNOSTIC RADIOPHARMACEUTICAL: Performed by: RADIOLOGY

## 2025-03-14 RX ORDER — REGADENOSON 0.08 MG/ML
0.4 INJECTION, SOLUTION INTRAVENOUS
Status: COMPLETED | OUTPATIENT
Start: 2025-03-14 | End: 2025-03-14

## 2025-03-14 RX ORDER — TETRAKIS(2-METHOXYISOBUTYLISOCYANIDE)COPPER(I) TETRAFLUOROBORATE 1 MG/ML
30 INJECTION, POWDER, LYOPHILIZED, FOR SOLUTION INTRAVENOUS
Status: COMPLETED | OUTPATIENT
Start: 2025-03-14 | End: 2025-03-14

## 2025-03-14 RX ORDER — TETRAKIS(2-METHOXYISOBUTYLISOCYANIDE)COPPER(I) TETRAFLUOROBORATE 1 MG/ML
10 INJECTION, POWDER, LYOPHILIZED, FOR SOLUTION INTRAVENOUS
Status: COMPLETED | OUTPATIENT
Start: 2025-03-14 | End: 2025-03-14

## 2025-03-14 RX ADMIN — Medication 30 MILLICURIE: at 08:27

## 2025-03-14 RX ADMIN — REGADENOSON 0.4 MG: 0.08 INJECTION, SOLUTION INTRAVENOUS at 10:05

## 2025-03-14 RX ADMIN — Medication 10 MILLICURIE: at 08:26

## 2025-03-26 ENCOUNTER — TELEPHONE (OUTPATIENT)
Dept: SURGERY | Age: 65
End: 2025-03-26

## 2025-03-26 NOTE — TELEPHONE ENCOUNTER
Surgery has been scheduled at [address] on 4/15/25, Pre-Admission Testing will call you prior to surgery to inform you arrival time and any other additional directions,if they are unable to reach you,please call them two days prior at [phone].  If taking Fish Oil, Vitamins, two weeks prior to surgery stop taking. If taking NSAIDS (such as Aspirin, Ibuprofen) anticoagulants please consult with your prescribing physician to get further instructions on when to stop medication prior to surgery that is scheduled, patient understood.    Pre-Auth #:  CPT Codes:   ICD 10:

## 2025-04-10 ENCOUNTER — PREP FOR PROCEDURE (OUTPATIENT)
Dept: SURGERY | Age: 65
End: 2025-04-10

## 2025-04-10 ENCOUNTER — TELEPHONE (OUTPATIENT)
Dept: SURGERY | Age: 65
End: 2025-04-10

## 2025-04-10 RX ORDER — HYDROCHLOROTHIAZIDE 12.5 MG/1
12.5 TABLET ORAL
COMMUNITY
Start: 2024-07-25

## 2025-04-10 RX ORDER — HYDROXYZINE HYDROCHLORIDE 10 MG/1
20 TABLET, FILM COATED ORAL
COMMUNITY
Start: 2025-02-12

## 2025-04-10 RX ORDER — TRIAMCINOLONE ACETONIDE 1 MG/G
CREAM TOPICAL
COMMUNITY
Start: 2025-02-26

## 2025-04-10 RX ORDER — ASPIRIN 81 MG/1
81 TABLET ORAL DAILY
COMMUNITY
Start: 2025-03-03

## 2025-04-10 NOTE — TELEPHONE ENCOUNTER
Court ROSE called and left message that if there were any questions regarding coverage, his nurse is Tianna Altamirano([phone])

## 2025-04-10 NOTE — PROGRESS NOTES
[facility]   PRE-ADMISSION TESTING GENERAL INSTRUCTIONS  PAT Phone Number: [phone]      GENERAL INSTRUCTIONS:    [x] Antibacterial Soap Shower Night before AND the Morning of procedure.  [] The Night Before Surgery: Take an antibacterial soap shower - followed by CHG Wipes.   -The Morning of Surgery: Repeat CHG Wipes.  [x] Do not wear makeup, lotions, powders, deodorant the morning of surgery.  [x] No solid food after midnight. You may have SIPS of clear liquids up until 2 hours before your arrival time to the hospital.   [x] You may brush your teeth, gargle, but do not swallow water.   [x] No tobacco products, illegal drugs, or alcohol within 24 hours of your surgery.  [x] Jewelry or valuables should not be brought to the hospital. All body and/or tongue piercing's must be removed prior to arriving to hospital. No contact lens or hair pins.   [x] Arrange transportation with a responsible adult  to and from the hospital. Arrange for someone to be with you for the remainder of the day and for 24 hours after your procedure due to having had anesthesia.          -Who will be your  for transportation? Raman        -Who will be staying with you for 24 hrs after your procedure? Raman  [x] Bring insurance card and photo ID.  [x] Bring copy of living will or healthcare power of  papers to be placed in your electronic record.  [] Urine Pregnancy test will be preformed the day of surgery. A specimen sample may be brought from home.  [] Transfusion (Green) Bracelet: Please bring with you to hospital, day of surgery.     PARKING INSTRUCTIONS:     [x] ARRIVAL DATE & TIME: 4/15/25 at 1245  [x] Times are subject to change. We will contact you the business day before surgery if that were to occur.  [x] Enter into the [facility] Entrance. Two people may accompany you. Masks are not required.  [x] Parking Lot \"I\" is where you will park. It is located on the corner of [address]

## 2025-04-14 ENCOUNTER — ANESTHESIA EVENT (OUTPATIENT)
Dept: OPERATING ROOM | Age: 65
End: 2025-04-14
Payer: OTHER GOVERNMENT

## 2025-04-14 NOTE — PROGRESS NOTES
Patient notified of time change for procedure. New arrival time is now 1130. Patient verbalized understanding.

## 2025-04-15 ENCOUNTER — ANESTHESIA (OUTPATIENT)
Dept: OPERATING ROOM | Age: 65
End: 2025-04-15
Payer: OTHER GOVERNMENT

## 2025-04-15 ENCOUNTER — HOSPITAL ENCOUNTER (OUTPATIENT)
Age: 65
Setting detail: OUTPATIENT SURGERY
Discharge: HOME | End: 2025-04-15
Attending: PLASTIC SURGERY | Admitting: PLASTIC SURGERY
Payer: OTHER GOVERNMENT

## 2025-04-15 VITALS
DIASTOLIC BLOOD PRESSURE: 74 MMHG | RESPIRATION RATE: 16 BRPM | HEIGHT: 69 IN | BODY MASS INDEX: 36.29 KG/M2 | HEART RATE: 77 BPM | TEMPERATURE: 98.7 F | WEIGHT: 245 LBS | OXYGEN SATURATION: 95 % | SYSTOLIC BLOOD PRESSURE: 118 MMHG

## 2025-04-15 DIAGNOSIS — G89.18 POST-OP PAIN: Primary | ICD-10-CM

## 2025-04-15 DIAGNOSIS — C44.320 SQUAMOUS CELL CARCINOMA, FACE: ICD-10-CM

## 2025-04-15 PROCEDURE — 6360000002 HC RX W HCPCS: Performed by: PLASTIC SURGERY

## 2025-04-15 PROCEDURE — 7100000010 HC PHASE II RECOVERY - FIRST 15 MIN: Performed by: PLASTIC SURGERY

## 2025-04-15 PROCEDURE — 7100000011 HC PHASE II RECOVERY - ADDTL 15 MIN: Performed by: PLASTIC SURGERY

## 2025-04-15 PROCEDURE — 2500000003 HC RX 250 WO HCPCS: Performed by: PLASTIC SURGERY

## 2025-04-15 PROCEDURE — 3700000001 HC ADD 15 MINUTES (ANESTHESIA): Performed by: PLASTIC SURGERY

## 2025-04-15 PROCEDURE — 11643 EXC F/E/E/N/L MAL+MRG 2.1-3: CPT | Performed by: PLASTIC SURGERY

## 2025-04-15 PROCEDURE — 3700000000 HC ANESTHESIA ATTENDED CARE: Performed by: PLASTIC SURGERY

## 2025-04-15 PROCEDURE — 6360000002 HC RX W HCPCS

## 2025-04-15 PROCEDURE — 3600000005 HC SURGERY LEVEL 5 BASE: Performed by: PLASTIC SURGERY

## 2025-04-15 PROCEDURE — 3600000015 HC SURGERY LEVEL 5 ADDTL 15MIN: Performed by: PLASTIC SURGERY

## 2025-04-15 PROCEDURE — 88305 TISSUE EXAM BY PATHOLOGIST: CPT

## 2025-04-15 PROCEDURE — 2709999900 HC NON-CHARGEABLE SUPPLY: Performed by: PLASTIC SURGERY

## 2025-04-15 PROCEDURE — 6370000000 HC RX 637 (ALT 250 FOR IP): Performed by: PLASTIC SURGERY

## 2025-04-15 PROCEDURE — 15260 FTH/GFT FR N/E/E/L 20 SQCM/<: CPT | Performed by: PLASTIC SURGERY

## 2025-04-15 PROCEDURE — 2580000003 HC RX 258

## 2025-04-15 PROCEDURE — 88331 PATH CONSLTJ SURG 1 BLK 1SPC: CPT

## 2025-04-15 PROCEDURE — 88332 PATH CONSLTJ SURG EA ADD BLK: CPT

## 2025-04-15 RX ORDER — SODIUM CHLORIDE 9 MG/ML
INJECTION, SOLUTION INTRAVENOUS PRN
Status: DISCONTINUED | OUTPATIENT
Start: 2025-04-15 | End: 2025-04-15 | Stop reason: HOSPADM

## 2025-04-15 RX ORDER — SODIUM CHLORIDE 0.9 % (FLUSH) 0.9 %
5-40 SYRINGE (ML) INJECTION PRN
Status: DISCONTINUED | OUTPATIENT
Start: 2025-04-15 | End: 2025-04-15 | Stop reason: HOSPADM

## 2025-04-15 RX ORDER — PROPOFOL 10 MG/ML
INJECTION, EMULSION INTRAVENOUS
Status: DISCONTINUED | OUTPATIENT
Start: 2025-04-15 | End: 2025-04-15 | Stop reason: SDUPTHER

## 2025-04-15 RX ORDER — POVIDONE-IODINE 5 %
SOLUTION, NON-ORAL OPHTHALMIC (EYE) PRN
Status: DISCONTINUED | OUTPATIENT
Start: 2025-04-15 | End: 2025-04-15 | Stop reason: ALTCHOICE

## 2025-04-15 RX ORDER — SODIUM CHLORIDE 9 MG/ML
INJECTION, SOLUTION INTRAVENOUS
Status: DISCONTINUED | OUTPATIENT
Start: 2025-04-15 | End: 2025-04-15 | Stop reason: SDUPTHER

## 2025-04-15 RX ORDER — BACITRACIN ZINC 500 [USP'U]/G
OINTMENT TOPICAL PRN
Status: DISCONTINUED | OUTPATIENT
Start: 2025-04-15 | End: 2025-04-15 | Stop reason: ALTCHOICE

## 2025-04-15 RX ORDER — SODIUM CHLORIDE 9 MG/ML
INJECTION, SOLUTION INTRAVENOUS CONTINUOUS
Status: DISCONTINUED | OUTPATIENT
Start: 2025-04-15 | End: 2025-04-15 | Stop reason: HOSPADM

## 2025-04-15 RX ORDER — LIDOCAINE HYDROCHLORIDE AND EPINEPHRINE 10; 10 MG/ML; UG/ML
INJECTION, SOLUTION INFILTRATION; PERINEURAL PRN
Status: DISCONTINUED | OUTPATIENT
Start: 2025-04-15 | End: 2025-04-15 | Stop reason: ALTCHOICE

## 2025-04-15 RX ORDER — GINSENG 100 MG
CAPSULE ORAL
Qty: 14 G | Refills: 1 | Status: SHIPPED | OUTPATIENT
Start: 2025-04-15

## 2025-04-15 RX ORDER — CEPHALEXIN 500 MG/1
500 CAPSULE ORAL 4 TIMES DAILY
Qty: 20 CAPSULE | Refills: 0 | Status: SHIPPED | OUTPATIENT
Start: 2025-04-15 | End: 2025-04-20

## 2025-04-15 RX ORDER — SODIUM CHLORIDE 0.9 % (FLUSH) 0.9 %
5-40 SYRINGE (ML) INJECTION EVERY 12 HOURS SCHEDULED
Status: DISCONTINUED | OUTPATIENT
Start: 2025-04-15 | End: 2025-04-15 | Stop reason: HOSPADM

## 2025-04-15 RX ORDER — MIDAZOLAM HYDROCHLORIDE 1 MG/ML
INJECTION, SOLUTION INTRAMUSCULAR; INTRAVENOUS
Status: DISCONTINUED | OUTPATIENT
Start: 2025-04-15 | End: 2025-04-15 | Stop reason: SDUPTHER

## 2025-04-15 RX ORDER — HYDROCODONE BITARTRATE AND ACETAMINOPHEN 5; 325 MG/1; MG/1
1 TABLET ORAL EVERY 4 HOURS PRN
Qty: 5 TABLET | Refills: 0 | Status: SHIPPED | OUTPATIENT
Start: 2025-04-15 | End: 2025-04-20

## 2025-04-15 RX ORDER — FENTANYL CITRATE 50 UG/ML
INJECTION, SOLUTION INTRAMUSCULAR; INTRAVENOUS
Status: DISCONTINUED | OUTPATIENT
Start: 2025-04-15 | End: 2025-04-15 | Stop reason: SDUPTHER

## 2025-04-15 RX ADMIN — FENTANYL CITRATE 50 MCG: 50 INJECTION, SOLUTION INTRAMUSCULAR; INTRAVENOUS at 11:52

## 2025-04-15 RX ADMIN — CEFAZOLIN 2000 MG: 2 INJECTION, POWDER, FOR SOLUTION INTRAMUSCULAR; INTRAVENOUS at 12:02

## 2025-04-15 RX ADMIN — MIDAZOLAM 1 MG: 1 INJECTION INTRAMUSCULAR; INTRAVENOUS at 11:52

## 2025-04-15 RX ADMIN — MIDAZOLAM 1 MG: 1 INJECTION INTRAMUSCULAR; INTRAVENOUS at 11:57

## 2025-04-15 RX ADMIN — SODIUM CHLORIDE: 9 INJECTION, SOLUTION INTRAVENOUS at 11:52

## 2025-04-15 RX ADMIN — PROPOFOL 100 MCG/KG/MIN: 10 INJECTION, EMULSION INTRAVENOUS at 12:00

## 2025-04-15 ASSESSMENT — ENCOUNTER SYMPTOMS: SHORTNESS OF BREATH: 1

## 2025-04-15 ASSESSMENT — PAIN - FUNCTIONAL ASSESSMENT: PAIN_FUNCTIONAL_ASSESSMENT: NONE - DENIES PAIN

## 2025-04-15 ASSESSMENT — PAIN SCALES - GENERAL: PAINLEVEL_OUTOF10: 0

## 2025-04-15 ASSESSMENT — LIFESTYLE VARIABLES: SMOKING_STATUS: 1

## 2025-04-15 NOTE — H&P
Department of Plastic Surgery - Adult  Attending Consult Note        CHIEF COMPLAINT:   lesion suspicious for squamous Cell Cancer of left nasal vestibule     History Obtained From:  patient     HISTORY OF PRESENT ILLNESS:                 The patient is a 64 y.o. male who presents with left nasal vestibule with prior biopsy.  The patient states that they first noticed the lesion several years ago.  It has grown in size since they first noticed the lesion.  The lesion has not changed in color and has not had discharge or bleeding.  The pt has had the lesion biopsied previously. Reports that he had the lesion debulked at with Dr. Doty. He was then referred to ENT and had additional biopsy and was referred here.  The patient has had the lesion removed previously. The patient states the lesion is not painfull.  The pt denies any associated symptoms.  Had a previous SCC removed from the ear.      Past Medical History:    Past Medical History           Past Medical History:   Diagnosis Date    Anxiety      Bipolar disorder (HCC)      Cancer (HCC)       squamous cell carcinoma 5 areas on face    Hyperlipidemia      Hypertension      Major depressive disorder      Pain       knees and left arm pain    PTSD (post-traumatic stress disorder)           Past Surgical History:    Past Surgical History             Past Surgical History:   Procedure Laterality Date    APPENDECTOMY   2018     laparoscopic, acute appenditis, [place], [place]    COLONOSCOPY   2015     approximately 2015, per patient had polyps removed but no report available for review, pt does not remember where it was done at or who did it    COLONOSCOPY   10/05/2020     Multiple right and left-sided colon polyps partial removed with polypectomy and biopsy cauterization, Dr. Sarabia, [facility]    COLONOSCOPY N/A 10/05/2020     COLONOSCOPY POLYPECTOMY SNARE/COLD BIOPSY performed by Yordan Sarabia MD at [facility]    COLONOSCOPY N/A 10/05/2020      the risk and benefits. They have no further questions and agree to proceed with surgery.    Height 1.753 m (5' 9\"), weight 111.1 kg (245 lb).      Kalen Moore MD   11:09 AM  4/15/2025  '

## 2025-04-15 NOTE — DISCHARGE INSTRUCTIONS
Discharge Home.   Call office to schedule a follow-up appointment at [phone]  Please call to schedule an appointment to be seen In our office on Monday 4-21-25  Diet: regular diet  Activity: ambulate in house and no Strenuous exercise for 1 week  Shower/Bathing: OK to shower at this time below the level of the neck.  No baths, hot tubs or soaking of the wound site at this time.   Dressings /Splint /Wound Care:  Apply bacitracin two times daily to the wound site bolster dressing ( dressing sutured into place ).  The bolster dressing will remain sutured into place over the skin graft until seen in our office.  There is a dressing over the skin graft donor site.  This dressing can remain covered until seen in our office.  Driving: It is OK to drive if the following criteria are met:   1- Not taking narcotic pain medication   2- Not distracted by pain or limited ROM   3- Not a hazard to self or others on the road  Medications: As prescribed.  Educated to contact physician at [phone] with concerns or signs of infection (redness, increasing pain, discharge, odor, fever).

## 2025-04-15 NOTE — PROGRESS NOTES
Patient admitted to DA Patient placed on appropriate monitors. Dressing dry and intact.. Steri strips left shoulder dry and intact. Pt verified using 2 Identifiers and ID band with OR staff prior to acceptance to PACU/Phase II care.

## 2025-04-15 NOTE — OP NOTE
all questions were answered.  The patient was taken back to the operating room, placed in supine position.  SCDs were on and functioning at the time of induction of anesthesia.  Preoperative antibiotics were given prior to incision.  The area was prepped and draped in the usual sterile fashion with betadine.  The area was marked and measured and 3-mm margins were taken circumferentially around the lesion.  The area was then anesthetized with 1% lidocaine with 100,000 epinephrine and allowed to work.  A 15 blade was used to incise full thickness through the skin and the lesion was sharply lifted off the underlying subcutaneous tissue. The specimen was marked and sent for frozen section with representative margins negative for tumor.  Hemostasis was achieved with monopolar cautery.  The area was assessed for the best manner of reconstruction.  The wound could not be closed by complex means and local tissue rearrangement was not a good option, therefore a full-thickness skin graft was planned.  The surface area of the defect was then translated to the left clavicular area and incorporated into an elliptical excision.  This was anesthetized in the same way. A 15 blade was used to incise full thickness and was sharply lifted off the underlying subcutaneous tissue.  The underside of the skin graft was removed of fat and just, the dermis remained.  This was then placed dermis side down and tailored to fit the defect.  Suture was performed with a 4-0 chromic simple interrupted and running suture.  This was then pie-crusted with an 11 blade.  A bolster consisting of sterile cotton and Xeroform was placed over the wound, applied gentle pressure, and tie-overs using 4-0 silk were utilized.  Bacitracin was then applied and 4 x 4's and a loose Kerlix.  The defect from the full-thickness skin graft was closed with 3-0 Monocryl deep dermal and 4-0 Monocryl running subcuticular sutures followed by Dermabond, Steri-Strips, and  Tegaderm.       The patient emerged from anesthesia without complication and taken to PACU in good condition.        Electronically signed by Kalen Moore MD on 4/15/2025 at 12:58 PM

## 2025-04-15 NOTE — ANESTHESIA POSTPROCEDURE EVALUATION
Department of Anesthesiology  Postprocedure Note    Patient: Jones Michel  MRN: [id number]  YOB: 1960  Date of evaluation: 4/15/2025    Procedure Summary       Date: 04/15/25 Room / Location: [address]    Anesthesia Start: 1154 Anesthesia Stop:     Procedure: Excision of Squamous Cell Carcinoma of Left Columella Nose, Possible Local Tissue Rearrangement, Possible Full Thickness Skin Graft (Face) Diagnosis:       Squamous cell carcinoma, face      (Squamous cell carcinoma, face [C44.320])    Surgeons: Kalen Moore MD Responsible Provider:     Anesthesia Type: MAC ASA Status: 3            Anesthesia Type: MAC    Cristian Phase I: Cristian Score: 10    Cristian Phase II:      Anesthesia Post Evaluation    Patient location during evaluation: PACU  Patient participation: complete - patient participated  Level of consciousness: awake  Pain score: 3  Airway patency: patent  Nausea & Vomiting: no nausea and no vomiting  Cardiovascular status: blood pressure returned to baseline  Respiratory status: acceptable  Hydration status: euvolemic      No notable events documented.

## 2025-04-15 NOTE — PROGRESS NOTES
INTRAOPERATIVE CONSULTATION (with FROZEN SECTION)    -En face posterior and anterior margins negative for carcinoma.  -Superior, Deep, and Inferior margins negative for carcinoma.  -Squamous dysplasia present at superior peripheral margin.

## 2025-04-15 NOTE — ANESTHESIA PRE PROCEDURE
06:55 AM     03/06/2021 06:55 AM    CO2 27 03/06/2021 06:55 AM    BUN 4 03/06/2021 06:55 AM    CREATININE 0.9 03/06/2021 06:55 AM    GFRAA >60 03/06/2021 06:55 AM    LABGLOM >60 03/06/2021 06:55 AM    GLUCOSE 100 03/06/2021 06:55 AM    CALCIUM 8.6 03/06/2021 06:55 AM    BILITOT 0.5 01/11/2014 11:00 AM    ALKPHOS 85 01/11/2014 11:00 AM    AST 16 01/11/2014 11:00 AM    ALT 17 01/11/2014 11:00 AM       POC Tests: No results for input(s): \"POCGLU\", \"POCNA\", \"POCK\", \"POCCL\", \"POCBUN\", \"POCHEMO\", \"POCHCT\" in the last 72 hours.    Coags: No results found for: \"PROTIME\", \"INR\", \"APTT\"    HCG (If Applicable): No results found for: \"PREGTESTUR\", \"PREGSERUM\", \"HCG\", \"HCGQUANT\"     ABGs: No results found for: \"PHART\", \"PO2ART\", \"KPE6YTI\", \"ZUE2SBD\", \"BEART\", \"S6FHMGIM\"     Type & Screen (If Applicable):  Lab Results   Component Value Date    ABORH A POS 02/25/2021    LABANTI NEG 02/25/2021       Drug/Infectious Status (If Applicable):  No results found for: \"HIV\", \"HEPCAB\"    COVID-19 Screening (If Applicable):   Lab Results   Component Value Date/Time    COVID19 Not Detected 02/25/2021 11:21 AM           Anesthesia Evaluation     no history of anesthetic complications:   Airway: Mallampati: II  TM distance: >3 FB   Neck ROM: full  Mouth opening: > = 3 FB   Dental:    (+) poor dentition      Pulmonary: breath sounds clear to auscultation  (+)   shortness of breath (with exertion): new,     decreased breath sounds: bilateral    current smoker          Patient smoked on day of surgery.                 Cardiovascular:  Exercise tolerance: good (>4 METS)  (+) hypertension: no interval change, hyperlipidemia      ECG reviewed  Rhythm: regular  Rate: normal    Stress test reviewed       Beta Blocker:  Not on Beta Blocker         Neuro/Psych:   (+) psychiatric history (PTSD; bipolar disorder):depression/anxiety  (depression & anxiety)            GI/Hepatic/Renal:             Endo/Other:    (+) Diabetes (pre-diabetic),

## 2025-04-21 ENCOUNTER — OFFICE VISIT (OUTPATIENT)
Dept: SURGERY | Age: 65
End: 2025-04-21

## 2025-04-21 VITALS — TEMPERATURE: 97.7 F | OXYGEN SATURATION: 97 % | HEART RATE: 91 BPM

## 2025-04-21 DIAGNOSIS — C44.320 SQUAMOUS CELL CARCINOMA, FACE: Primary | ICD-10-CM

## 2025-04-21 PROCEDURE — 99024 POSTOP FOLLOW-UP VISIT: CPT | Performed by: PHYSICIAN ASSISTANT

## 2025-04-21 NOTE — PROGRESS NOTES
Subjective:    Follow up today from  Excision of Squamous Cell Carcinoma of Left Columella Nose, with Full Thickness Skin Graft 4/15/2025 . Denies fever, nausea, vomiting, leg pain or swelling, pain is absent.  The pt states that they have  kept the skin graft covered with bolster dressings sutured in place as well as Steri-Strips and Tegaderm to their donor site.   They state that their pain is absent. The patient states that they have  finished their antibiotic.     Objective:    Pulse 91   Temp 97.7 °F (36.5 °C) (Temporal)   SpO2 97%       Donor Wound: Clean, and intact.  No signs of infection, wound healing well    FTSG Wound Site : Bolster dressing intact , approximately 99% of the FTSG intact     Neuro- Sensation  intact to radha wound sites with light touch     Assessment:    Patient Active Problem List   Diagnosis    MDD (major depressive disorder)    Hypertension    Hyperlipidemia    History of colon polyps    S/P laparoscopic colectomy    COVID-19    Squamous cell carcinoma, face    Post-op pain       Labs: CBC:   Lab Results   Component Value Date/Time    WBC 5.7 03/05/2021 07:13 AM    RBC 4.20 03/05/2021 07:13 AM    HGB 13.6 03/05/2021 07:13 AM    HCT 40.9 03/05/2021 07:13 AM    MCV 97.4 03/05/2021 07:13 AM    MCH 32.4 03/05/2021 07:13 AM    MCHC 33.3 03/05/2021 07:13 AM    RDW 13.4 03/05/2021 07:13 AM     03/05/2021 07:13 AM    MPV 9.9 03/05/2021 07:13 AM     BMP:    Lab Results   Component Value Date/Time     03/06/2021 06:55 AM    K 4.5 03/06/2021 06:55 AM     03/06/2021 06:55 AM    CO2 27 03/06/2021 06:55 AM    BUN 4 03/06/2021 06:55 AM    CREATININE 0.9 03/06/2021 06:55 AM    CALCIUM 8.6 03/06/2021 06:55 AM    GFRAA >60 03/06/2021 06:55 AM    LABGLOM >60 03/06/2021 06:55 AM    GLUCOSE 100 03/06/2021 06:55 AM         Pathology Report-pending    Plan:     Status Post : Excision of Squamous Cell Carcinoma of Left Columella Nose, with Full Thickness Skin Graft 4/15/2025      Educated

## 2025-04-24 LAB — SURGICAL PATHOLOGY REPORT: NORMAL

## 2025-04-30 ENCOUNTER — OFFICE VISIT (OUTPATIENT)
Dept: SURGERY | Age: 65
End: 2025-04-30

## 2025-04-30 VITALS
OXYGEN SATURATION: 94 % | DIASTOLIC BLOOD PRESSURE: 92 MMHG | HEART RATE: 94 BPM | TEMPERATURE: 98.2 F | SYSTOLIC BLOOD PRESSURE: 142 MMHG

## 2025-04-30 DIAGNOSIS — C44.320 SQUAMOUS CELL CARCINOMA, FACE: Primary | ICD-10-CM

## 2025-04-30 PROCEDURE — 99024 POSTOP FOLLOW-UP VISIT: CPT | Performed by: PHYSICIAN ASSISTANT

## 2025-04-30 NOTE — PROGRESS NOTES
Subjective:    Follow up today from  Excision of Squamous Cell Carcinoma of Left Columella Nose, with Full Thickness Skin Graft 4/15/2025 . Denies fever, nausea, vomiting, leg pain or swelling, pain is absent.  He presents today for continued wound examination and pathology results  Objective:    BP (!) 142/92 (BP Site: Left Upper Arm, Patient Position: Sitting, BP Cuff Size: Medium Adult)   Pulse 94   Temp 98.2 °F (36.8 °C) (Temporal)   SpO2 94%       Donor Wound: Clean, and intact.  No signs of infection, wound healing well    FTSG Wound Site : well healed    Neuro- Sensation  intact to radha wound sites with light touch     Assessment:    Patient Active Problem List   Diagnosis    MDD (major depressive disorder)    Hypertension    Hyperlipidemia    History of colon polyps    S/P laparoscopic colectomy    COVID-19    Squamous cell carcinoma, face    Post-op pain       Labs: CBC:   Lab Results   Component Value Date/Time    WBC 5.7 03/05/2021 07:13 AM    RBC 4.20 03/05/2021 07:13 AM    HGB 13.6 03/05/2021 07:13 AM    HCT 40.9 03/05/2021 07:13 AM    MCV 97.4 03/05/2021 07:13 AM    MCH 32.4 03/05/2021 07:13 AM    MCHC 33.3 03/05/2021 07:13 AM    RDW 13.4 03/05/2021 07:13 AM     03/05/2021 07:13 AM    MPV 9.9 03/05/2021 07:13 AM     BMP:    Lab Results   Component Value Date/Time     03/06/2021 06:55 AM    K 4.5 03/06/2021 06:55 AM     03/06/2021 06:55 AM    CO2 27 03/06/2021 06:55 AM    BUN 4 03/06/2021 06:55 AM    CREATININE 0.9 03/06/2021 06:55 AM    CALCIUM 8.6 03/06/2021 06:55 AM    GFRAA >60 03/06/2021 06:55 AM    LABGLOM >60 03/06/2021 06:55 AM    GLUCOSE 100 03/06/2021 06:55 AM         Pathology Report-   Pathology Report Path Number: [id number]    -- Diagnosis --  Skin of nasal columella, excision:  Invasive well-differentiated squamous cell carcinoma (0.7 cm)  extending into mid reticular dermis.  All inked excision margins  negative for malignancy (see comment).  Negative for

## 2025-08-21 ENCOUNTER — TRANSCRIBE ORDERS (OUTPATIENT)
Dept: ADMINISTRATIVE | Age: 65
End: 2025-08-21

## 2025-08-21 DIAGNOSIS — R91.1 PULMONARY NODULE: Primary | ICD-10-CM

## (undated) DEVICE — PATIENT RETURN ELECTRODE, SINGLE-USE, CONTACT QUALITY MONITORING, ADULT, WITH 9FT CORD, FOR PATIENTS WEIGING OVER 33LBS. (15KG): Brand: MEGADYNE

## (undated) DEVICE — STAPLER INT L75MM CUT LN L73MM STPL LN L77MM BLU B FRM 8

## (undated) DEVICE — STRAP POS MP 30X3 IN HK LOOP CLOSURE FOAM DISP

## (undated) DEVICE — 3M™ IOBAN™ 2 ANTIMICROBIAL INCISE DRAPE 6650EZ: Brand: IOBAN™ 2

## (undated) DEVICE — GLOVE SURG SIGNATURE LTX ESS LTX PF 7.5

## (undated) DEVICE — SWABSTICK MEDICATED L4IN BENZ TINC SKIN PREP APLICARE

## (undated) DEVICE — APPLICATOR MEDICATED 26 CC SOLUTION HI LT ORNG CHLORAPREP

## (undated) DEVICE — LIQUIBAND RAPID ADHESIVE 36/CS 0.8ML: Brand: MEDLINE

## (undated) DEVICE — ADHESIVE SKIN CLOSURE 0.7CC TOP MICROBIAL APPL DERMBND ADV

## (undated) DEVICE — GAUZE,SPONGE,POST-OP,4X3,STRL,LF: Brand: MEDLINE

## (undated) DEVICE — STERILE POLYISOPRENE POWDER-FREE SURGICAL GLOVES: Brand: PROTEXIS

## (undated) DEVICE — MARKER SURG SKIN FULL SZ PUR TRAD INK REGULAR ULTRA FN TWIN

## (undated) DEVICE — DRESSING COMP W4XL4IN N ADH PD W2.5XL2.5IN GZ BORDERED ADH

## (undated) DEVICE — WARMER SCP LAP

## (undated) DEVICE — SEALER ENDOSCP L37CM NANO COAT BLNT TIP LAP DIV

## (undated) DEVICE — ACCESS PLATFORM FOR MINIMALLY INVASIVE SURGERY.: Brand: GELPORT® LAPAROSCOPIC  SYSTEM

## (undated) DEVICE — SNARE ENDOSCP L240CM LOOP W13MM SHTH DIA2.4MM SM OVL FLX

## (undated) DEVICE — CANNULA NSL ORAL AD FOR CAPNOFLEX CO2 O2 AIRLFE

## (undated) DEVICE — DRESSING,GAUZE,XEROFORM,CURAD,5"X9",ST: Brand: CURAD

## (undated) DEVICE — ELECTRODE PT RET AD L9FT HI MOIST COND ADH HYDRGEL CORDED

## (undated) DEVICE — SURGICAL PROCEDURE PACK BASIC

## (undated) DEVICE — GOWN,BREATHABLE SLV,AURORA,XLG,STRL: Brand: MEDLINE

## (undated) DEVICE — NEEDLE HYPO 27GA L15IN REG BVL W O SFTY FOR SYR DISPOSABLE

## (undated) DEVICE — HEAD AND NECK: Brand: MEDLINE INDUSTRIES, INC.

## (undated) DEVICE — SET ENDO INSTR LAPAROSCOPIC STGENLAP

## (undated) DEVICE — CONNECTOR TBNG AUX H2O JET DISP FOR OLY 160/180 SER

## (undated) DEVICE — BASIC SINGLE BASIN 1-LF: Brand: MEDLINE INDUSTRIES, INC.

## (undated) DEVICE — TRAY,SKIN SCRUB,DRY,W/GAUZE: Brand: MEDLINE

## (undated) DEVICE — SEALER LAP L37CM MARYLAND JAW OPN NANO COAT MULTIFUNCTIONAL

## (undated) DEVICE — KIT,ANTI FOG,W/SPONGE & FLUID,SOFT PACK: Brand: MEDLINE

## (undated) DEVICE — TRAP POLYP ETRAP

## (undated) DEVICE — GARMENT,MEDLINE,DVT,INT,CALF,MED, GEN2: Brand: MEDLINE

## (undated) DEVICE — CAMERA STRYKER 1488 HD GEN

## (undated) DEVICE — TOTAL TRAY, 16FR 10ML SIL FOLEY, URN: Brand: MEDLINE

## (undated) DEVICE — MICRODISSECTION NEEDLE STRAIGHT SLEEVE: Brand: COLORADO

## (undated) DEVICE — SET INSTRUMENT LAP I

## (undated) DEVICE — GOWN,SIRUS,FABRNF,XL,20/CS: Brand: MEDLINE

## (undated) DEVICE — CONTAINER SPEC 60ML PH 7NEUTRAL BUFF FRMLN RDY TO USE

## (undated) DEVICE — YANKAUER,POOLE TIP,STERILE,50/CS: Brand: MEDLINE

## (undated) DEVICE — STRIP SKIN CLOSURE 5X1 IN REINF N WOVEN WHT STERI STRP

## (undated) DEVICE — INTENDED FOR TISSUE SEPARATION, AND OTHER PROCEDURES THAT REQUIRE A SHARP SURGICAL BLADE TO PUNCTURE OR CUT.: Brand: BARD-PARKER ® STAINLESS STEEL BLADES

## (undated) DEVICE — FORCEPS BX L240CM JAW DIA2.4MM WRK CHN 2.8MM ORNG L CAP W/

## (undated) DEVICE — TOWEL,OR,DSP,ST,BLUE,STD,6/PK,12PK/CS: Brand: MEDLINE

## (undated) DEVICE — PAD,NON-ADHERENT,3X8,STERILE,LF,1/PK: Brand: MEDLINE

## (undated) DEVICE — PAD,NON-ADHERENT,2X3,STERILE,LF,1/PK: Brand: MEDLINE

## (undated) DEVICE — INSUFFLATION TUBING SET WITH FILTER, FUNNEL CONNECTOR AND LUER LOCK: Brand: JOSNOE MEDICAL INC

## (undated) DEVICE — RELOAD STPL L75MM OPN H3.8MM CLS 1.5MM WIRE DIA0.2MM REG

## (undated) DEVICE — FORCEPS LAP DIA5MM BCOCK FEN TIP ROT NONARTICULATING LOK

## (undated) DEVICE — SYRINGE 20ML LL S/C 50

## (undated) DEVICE — STAPLER SKIN L39MM DIA0.53MM CRWN 5.7MM S STL FIX HD PROX

## (undated) DEVICE — TROCAR: Brand: KII FIOS FIRST ENTRY

## (undated) DEVICE — Device

## (undated) DEVICE — TIBURON GENERAL ENDOSCOPY DRAPE: Brand: CONVERTORS

## (undated) DEVICE — SCISSORS ENDOSCP DIA5MM CRV MPLR CAUT W/ RATCH HNDL

## (undated) DEVICE — VALVE SUCTION AIR H2O SET ORCA POD + DISP